# Patient Record
Sex: FEMALE | Race: WHITE | NOT HISPANIC OR LATINO | Employment: PART TIME | URBAN - METROPOLITAN AREA
[De-identification: names, ages, dates, MRNs, and addresses within clinical notes are randomized per-mention and may not be internally consistent; named-entity substitution may affect disease eponyms.]

---

## 2016-03-25 LAB — PAP RECOMMENDATION EXT: NORMAL

## 2019-06-21 ENCOUNTER — HISTORICAL (OUTPATIENT)
Dept: LAB | Facility: HOSPITAL | Age: 49
End: 2019-06-21

## 2019-06-21 LAB
ABS NEUT (OLG): 3.71 X10(3)/MCL (ref 2.1–9.2)
ALBUMIN SERPL-MCNC: 4 GM/DL (ref 3.4–5)
ALBUMIN/GLOB SERPL: 1.5 {RATIO}
ALP SERPL-CCNC: 58 UNIT/L (ref 38–126)
ALT SERPL-CCNC: 22 UNIT/L (ref 12–78)
APPEARANCE, UA: CLEAR
AST SERPL-CCNC: 11 UNIT/L (ref 15–37)
BACTERIA SPEC CULT: ABNORMAL /HPF
BASOPHILS # BLD AUTO: 0 X10(3)/MCL (ref 0–0.2)
BASOPHILS NFR BLD AUTO: 1 %
BILIRUB SERPL-MCNC: 0.4 MG/DL (ref 0.2–1)
BILIRUB UR QL STRIP: NEGATIVE
BILIRUBIN DIRECT+TOT PNL SERPL-MCNC: 0.1 MG/DL (ref 0–0.2)
BILIRUBIN DIRECT+TOT PNL SERPL-MCNC: 0.3 MG/DL (ref 0–0.8)
BUN SERPL-MCNC: 12 MG/DL (ref 7–18)
CALCIUM SERPL-MCNC: 9.1 MG/DL (ref 8.5–10.1)
CHLORIDE SERPL-SCNC: 107 MMOL/L (ref 98–107)
CHOLEST SERPL-MCNC: 272 MG/DL (ref 0–200)
CHOLEST/HDLC SERPL: 4.8 {RATIO} (ref 0–4)
CO2 SERPL-SCNC: 31 MMOL/L (ref 21–32)
COLOR UR: YELLOW
CREAT SERPL-MCNC: 0.77 MG/DL (ref 0.55–1.02)
EOSINOPHIL # BLD AUTO: 0.1 X10(3)/MCL (ref 0–0.9)
EOSINOPHIL NFR BLD AUTO: 2 %
ERYTHROCYTE [DISTWIDTH] IN BLOOD BY AUTOMATED COUNT: 14.8 % (ref 11.5–17)
EST. AVERAGE GLUCOSE BLD GHB EST-MCNC: 120 MG/DL
GLOBULIN SER-MCNC: 2.7 GM/DL (ref 2.4–3.5)
GLUCOSE (UA): NEGATIVE
GLUCOSE SERPL-MCNC: 93 MG/DL (ref 74–106)
HBA1C MFR BLD: 5.8 % (ref 4.2–6.3)
HCT VFR BLD AUTO: 42.4 % (ref 37–47)
HDLC SERPL-MCNC: 57 MG/DL (ref 35–60)
HGB BLD-MCNC: 13.2 GM/DL (ref 12–16)
HGB UR QL STRIP: NEGATIVE
KETONES UR QL STRIP: NEGATIVE
LDLC SERPL CALC-MCNC: 192 MG/DL (ref 0–129)
LEUKOCYTE ESTERASE UR QL STRIP: ABNORMAL
LYMPHOCYTES # BLD AUTO: 2.4 X10(3)/MCL (ref 0.6–4.6)
LYMPHOCYTES NFR BLD AUTO: 36 %
MCH RBC QN AUTO: 26 PG (ref 27–31)
MCHC RBC AUTO-ENTMCNC: 31.1 GM/DL (ref 33–36)
MCV RBC AUTO: 83.6 FL (ref 80–94)
MONOCYTES # BLD AUTO: 0.4 X10(3)/MCL (ref 0.1–1.3)
MONOCYTES NFR BLD AUTO: 6 %
NEUTROPHILS # BLD AUTO: 3.71 X10(3)/MCL (ref 2.1–9.2)
NEUTROPHILS NFR BLD AUTO: 55 %
NITRITE UR QL STRIP: NEGATIVE
PH UR STRIP: >=9 [PH] (ref 5–9)
PLATELET # BLD AUTO: 224 X10(3)/MCL (ref 130–400)
PMV BLD AUTO: 10.6 FL (ref 9.4–12.4)
POTASSIUM SERPL-SCNC: 4.3 MMOL/L (ref 3.5–5.1)
PROT SERPL-MCNC: 6.7 GM/DL (ref 6.4–8.2)
PROT UR QL STRIP: ABNORMAL
RBC # BLD AUTO: 5.07 X10(6)/MCL (ref 4.2–5.4)
RBC #/AREA URNS HPF: 3 /HPF (ref 0–2)
SODIUM SERPL-SCNC: 143 MMOL/L (ref 136–145)
SP GR UR STRIP: 1.02 (ref 1–1.03)
SQUAMOUS EPITHELIAL, UA: 6 /HPF (ref 0–4)
TRIGL SERPL-MCNC: 114 MG/DL (ref 30–150)
TSH SERPL-ACNC: 0.74 MIU/L (ref 0.36–3.74)
UROBILINOGEN UR STRIP-ACNC: 1
VLDLC SERPL CALC-MCNC: 23 MG/DL
WBC # SPEC AUTO: 6.8 X10(3)/MCL (ref 4.5–11.5)
WBC #/AREA URNS HPF: ABNORMAL /[HPF]

## 2019-06-23 LAB — FINAL CULTURE: NO GROWTH

## 2019-08-07 ENCOUNTER — HISTORICAL (OUTPATIENT)
Dept: LAB | Facility: HOSPITAL | Age: 49
End: 2019-08-07

## 2019-08-07 LAB
ALBUMIN SERPL-MCNC: 4.1 GM/DL (ref 3.4–5)
ALBUMIN/GLOB SERPL: 1.5 {RATIO}
ALP SERPL-CCNC: 56 UNIT/L (ref 38–126)
ALT SERPL-CCNC: 28 UNIT/L (ref 12–78)
AST SERPL-CCNC: 12 UNIT/L (ref 15–37)
BILIRUB SERPL-MCNC: 0.4 MG/DL (ref 0.2–1)
BILIRUBIN DIRECT+TOT PNL SERPL-MCNC: 0.1 MG/DL (ref 0–0.2)
BILIRUBIN DIRECT+TOT PNL SERPL-MCNC: 0.3 MG/DL (ref 0–0.8)
BUN SERPL-MCNC: 14 MG/DL (ref 7–18)
CALCIUM SERPL-MCNC: 7 MG/DL (ref 8.5–10.1)
CHLORIDE SERPL-SCNC: 109 MMOL/L (ref 98–107)
CHOLEST SERPL-MCNC: 202 MG/DL (ref 0–200)
CHOLEST/HDLC SERPL: 3.9 {RATIO} (ref 0–4)
CO2 SERPL-SCNC: 25 MMOL/L (ref 21–32)
CREAT SERPL-MCNC: 0.66 MG/DL (ref 0.55–1.02)
GLOBULIN SER-MCNC: 2.7 GM/DL (ref 2.4–3.5)
GLUCOSE SERPL-MCNC: 96 MG/DL (ref 74–106)
HDLC SERPL-MCNC: 52 MG/DL (ref 35–60)
LDLC SERPL CALC-MCNC: 123 MG/DL (ref 0–129)
POTASSIUM SERPL-SCNC: 4.1 MMOL/L (ref 3.5–5.1)
PROT SERPL-MCNC: 6.8 GM/DL (ref 6.4–8.2)
SODIUM SERPL-SCNC: 142 MMOL/L (ref 136–145)
TRIGL SERPL-MCNC: 134 MG/DL (ref 30–150)
VLDLC SERPL CALC-MCNC: 27 MG/DL

## 2020-06-17 ENCOUNTER — HISTORICAL (OUTPATIENT)
Dept: ADMINISTRATIVE | Facility: HOSPITAL | Age: 50
End: 2020-06-17

## 2020-06-17 LAB
ABS NEUT (OLG): 3.52 X10(3)/MCL (ref 2.1–9.2)
ALBUMIN SERPL-MCNC: 4 GM/DL (ref 3.5–5)
ALBUMIN/GLOB SERPL: 1.5 RATIO (ref 1.1–2)
ALP SERPL-CCNC: 48 UNIT/L (ref 40–150)
ALT SERPL-CCNC: 28 UNIT/L (ref 0–55)
APPEARANCE, UA: CLEAR
AST SERPL-CCNC: 24 UNIT/L (ref 5–34)
BACTERIA SPEC CULT: NORMAL /HPF
BASOPHILS # BLD AUTO: 0 X10(3)/MCL (ref 0–0.2)
BASOPHILS NFR BLD AUTO: 1 %
BILIRUB SERPL-MCNC: 0.4 MG/DL
BILIRUB UR QL STRIP: NEGATIVE
BILIRUBIN DIRECT+TOT PNL SERPL-MCNC: 0.2 MG/DL (ref 0–0.5)
BILIRUBIN DIRECT+TOT PNL SERPL-MCNC: 0.2 MG/DL (ref 0–0.8)
BUN SERPL-MCNC: 11.8 MG/DL (ref 9.8–20.1)
CALCIUM SERPL-MCNC: 8.9 MG/DL (ref 8.4–10.2)
CHLORIDE SERPL-SCNC: 108 MMOL/L (ref 98–107)
CHOLEST SERPL-MCNC: 215 MG/DL
CHOLEST/HDLC SERPL: 4 {RATIO} (ref 0–5)
CO2 SERPL-SCNC: 25 MMOL/L (ref 22–29)
COLOR UR: YELLOW
CREAT SERPL-MCNC: 0.75 MG/DL (ref 0.55–1.02)
EOSINOPHIL # BLD AUTO: 0.2 X10(3)/MCL (ref 0–0.9)
EOSINOPHIL NFR BLD AUTO: 2 %
ERYTHROCYTE [DISTWIDTH] IN BLOOD BY AUTOMATED COUNT: 14.6 % (ref 11.5–17)
EST. AVERAGE GLUCOSE BLD GHB EST-MCNC: 114 MG/DL
GLOBULIN SER-MCNC: 2.6 GM/DL (ref 2.4–3.5)
GLUCOSE (UA): NEGATIVE
GLUCOSE SERPL-MCNC: 105 MG/DL (ref 74–100)
HBA1C MFR BLD: 5.6 %
HCT VFR BLD AUTO: 42.3 % (ref 37–47)
HDLC SERPL-MCNC: 52 MG/DL (ref 35–60)
HGB BLD-MCNC: 13 GM/DL (ref 12–16)
HGB UR QL STRIP: NEGATIVE
KETONES UR QL STRIP: NEGATIVE
LDLC SERPL CALC-MCNC: 138 MG/DL (ref 50–140)
LEUKOCYTE ESTERASE UR QL STRIP: NEGATIVE
LYMPHOCYTES # BLD AUTO: 2.1 X10(3)/MCL (ref 0.6–4.6)
LYMPHOCYTES NFR BLD AUTO: 33 %
MCH RBC QN AUTO: 26 PG (ref 27–31)
MCHC RBC AUTO-ENTMCNC: 30.7 GM/DL (ref 33–36)
MCV RBC AUTO: 84.6 FL (ref 80–94)
MONOCYTES # BLD AUTO: 0.5 X10(3)/MCL (ref 0.1–1.3)
MONOCYTES NFR BLD AUTO: 8 %
NEUTROPHILS # BLD AUTO: 3.52 X10(3)/MCL (ref 2.1–9.2)
NEUTROPHILS NFR BLD AUTO: 56 %
NITRITE UR QL STRIP: NEGATIVE
PH UR STRIP: 5 [PH] (ref 5–9)
PLATELET # BLD AUTO: 264 X10(3)/MCL (ref 130–400)
PMV BLD AUTO: 10.2 FL (ref 9.4–12.4)
POTASSIUM SERPL-SCNC: 4 MMOL/L (ref 3.5–5.1)
PROT SERPL-MCNC: 6.6 GM/DL (ref 6.4–8.3)
PROT UR QL STRIP: NEGATIVE
RBC # BLD AUTO: 5 X10(6)/MCL (ref 4.2–5.4)
RBC #/AREA URNS HPF: NORMAL /[HPF]
SODIUM SERPL-SCNC: 142 MMOL/L (ref 136–145)
SP GR UR STRIP: 1.03 (ref 1–1.03)
SQUAMOUS EPITHELIAL, UA: NORMAL
TRIGL SERPL-MCNC: 125 MG/DL (ref 37–140)
TSH SERPL-ACNC: 0.67 UIU/ML (ref 0.35–4.94)
UROBILINOGEN UR STRIP-ACNC: 0.2
VLDLC SERPL CALC-MCNC: 25 MG/DL
WBC # SPEC AUTO: 6.3 X10(3)/MCL (ref 4.5–11.5)
WBC #/AREA URNS HPF: NORMAL /[HPF]

## 2022-04-10 ENCOUNTER — HISTORICAL (OUTPATIENT)
Dept: ADMINISTRATIVE | Facility: HOSPITAL | Age: 52
End: 2022-04-10

## 2022-04-26 VITALS
HEIGHT: 65 IN | SYSTOLIC BLOOD PRESSURE: 118 MMHG | DIASTOLIC BLOOD PRESSURE: 76 MMHG | WEIGHT: 163.38 LBS | BODY MASS INDEX: 27.22 KG/M2 | OXYGEN SATURATION: 99 %

## 2022-05-12 RX ORDER — SERTRALINE HYDROCHLORIDE 100 MG/1
150 TABLET, FILM COATED ORAL DAILY
COMMUNITY
Start: 2003-02-12 | End: 2022-05-12 | Stop reason: SDUPTHER

## 2022-05-12 RX ORDER — SERTRALINE HYDROCHLORIDE 100 MG/1
150 TABLET, FILM COATED ORAL DAILY
Qty: 7 TABLET | Refills: 0 | Status: SHIPPED | OUTPATIENT
Start: 2022-05-12 | End: 2022-05-16 | Stop reason: SDUPTHER

## 2022-05-12 NOTE — TELEPHONE ENCOUNTER
----- Message from Asher Willis MA sent at 5/12/2022  3:25 PM CDT -----  Regarding: medication refill  .Type:  RX Refill Request    Who Called:  patient   Refill or New Rx: refill   RX Name and Strength:Zoloft  150mg   How is the patient currently taking it? (ex. 1XDay):once a day   Is this a 30 day or 90 day RX: 90  Preferred Pharmacy with phone number: [facility]   Local or Mail Order: local   Ordering Provider:Dr. Kim  Would the patient rather a call back or a response via [facility]? call  Best Call Back Number: [phone]  Additional Information: only suppose to be a few pills to hold her over until her next upcoming appointment

## 2022-05-16 ENCOUNTER — OFFICE VISIT (OUTPATIENT)
Dept: FAMILY MEDICINE | Facility: CLINIC | Age: 52
End: 2022-05-16
Payer: COMMERCIAL

## 2022-05-16 VITALS
TEMPERATURE: 98 F | HEART RATE: 84 BPM | HEIGHT: 65 IN | WEIGHT: 167.5 LBS | BODY MASS INDEX: 27.91 KG/M2 | RESPIRATION RATE: 17 BRPM | DIASTOLIC BLOOD PRESSURE: 69 MMHG | SYSTOLIC BLOOD PRESSURE: 115 MMHG | OXYGEN SATURATION: 97 %

## 2022-05-16 DIAGNOSIS — F43.10 POSTTRAUMATIC STRESS DISORDER: Chronic | ICD-10-CM

## 2022-05-16 DIAGNOSIS — E78.00 HYPERCHOLESTEROLEMIA: Chronic | ICD-10-CM

## 2022-05-16 DIAGNOSIS — F33.1 MODERATE EPISODE OF RECURRENT MAJOR DEPRESSIVE DISORDER: Chronic | ICD-10-CM

## 2022-05-16 DIAGNOSIS — Z78.0 POSTMENOPAUSAL: ICD-10-CM

## 2022-05-16 DIAGNOSIS — Z13.820 SCREENING FOR OSTEOPOROSIS: ICD-10-CM

## 2022-05-16 DIAGNOSIS — F17.200 NEEDS SMOKING CESSATION EDUCATION: ICD-10-CM

## 2022-05-16 DIAGNOSIS — Z00.00 WELL ADULT EXAM: Primary | ICD-10-CM

## 2022-05-16 DIAGNOSIS — R21 RASH: ICD-10-CM

## 2022-05-16 DIAGNOSIS — Z72.0 TOBACCO USER: Chronic | ICD-10-CM

## 2022-05-16 DIAGNOSIS — Z12.31 BREAST CANCER SCREENING BY MAMMOGRAM: ICD-10-CM

## 2022-05-16 PROBLEM — M26.629 TEMPOROMANDIBULAR JOINT PAIN-DYSFUNCTION SYNDROME: Status: ACTIVE | Noted: 2022-05-16

## 2022-05-16 PROBLEM — G47.00 INSOMNIA: Chronic | Status: ACTIVE | Noted: 2022-05-16

## 2022-05-16 PROBLEM — G47.00 INSOMNIA: Status: ACTIVE | Noted: 2022-05-16

## 2022-05-16 PROBLEM — M26.629 TEMPOROMANDIBULAR JOINT PAIN-DYSFUNCTION SYNDROME: Chronic | Status: ACTIVE | Noted: 2022-05-16

## 2022-05-16 PROCEDURE — 1159F MED LIST DOCD IN RCRD: CPT | Mod: CPTII,,, | Performed by: NURSE PRACTITIONER

## 2022-05-16 PROCEDURE — 1160F PR REVIEW ALL MEDS BY PRESCRIBER/CLIN PHARMACIST DOCUMENTED: ICD-10-PCS | Mod: CPTII,,, | Performed by: NURSE PRACTITIONER

## 2022-05-16 PROCEDURE — 1160F RVW MEDS BY RX/DR IN RCRD: CPT | Mod: CPTII,,, | Performed by: NURSE PRACTITIONER

## 2022-05-16 PROCEDURE — 3074F SYST BP LT 130 MM HG: CPT | Mod: CPTII,,, | Performed by: NURSE PRACTITIONER

## 2022-05-16 PROCEDURE — 90750 HZV VACC RECOMBINANT IM: CPT | Mod: ,,, | Performed by: NURSE PRACTITIONER

## 2022-05-16 PROCEDURE — 3074F PR MOST RECENT SYSTOLIC BLOOD PRESSURE < 130 MM HG: ICD-10-PCS | Mod: CPTII,,, | Performed by: NURSE PRACTITIONER

## 2022-05-16 PROCEDURE — 99396 PREV VISIT EST AGE 40-64: CPT | Mod: 25,,, | Performed by: NURSE PRACTITIONER

## 2022-05-16 PROCEDURE — 90471 IMMUNIZATION ADMIN: CPT | Mod: ,,, | Performed by: NURSE PRACTITIONER

## 2022-05-16 PROCEDURE — 90471 ZOSTER RECOMBINANT VACCINE: ICD-10-PCS | Mod: ,,, | Performed by: NURSE PRACTITIONER

## 2022-05-16 PROCEDURE — 3078F DIAST BP <80 MM HG: CPT | Mod: CPTII,,, | Performed by: NURSE PRACTITIONER

## 2022-05-16 PROCEDURE — 3008F BODY MASS INDEX DOCD: CPT | Mod: CPTII,,, | Performed by: NURSE PRACTITIONER

## 2022-05-16 PROCEDURE — 99396 PR PREVENTIVE VISIT,EST,40-64: ICD-10-PCS | Mod: 25,,, | Performed by: NURSE PRACTITIONER

## 2022-05-16 PROCEDURE — 1159F PR MEDICATION LIST DOCUMENTED IN MEDICAL RECORD: ICD-10-PCS | Mod: CPTII,,, | Performed by: NURSE PRACTITIONER

## 2022-05-16 PROCEDURE — 90750 ZOSTER RECOMBINANT VACCINE: ICD-10-PCS | Mod: ,,, | Performed by: NURSE PRACTITIONER

## 2022-05-16 PROCEDURE — 3078F PR MOST RECENT DIASTOLIC BLOOD PRESSURE < 80 MM HG: ICD-10-PCS | Mod: CPTII,,, | Performed by: NURSE PRACTITIONER

## 2022-05-16 PROCEDURE — 3008F PR BODY MASS INDEX (BMI) DOCUMENTED: ICD-10-PCS | Mod: CPTII,,, | Performed by: NURSE PRACTITIONER

## 2022-05-16 RX ORDER — SERTRALINE HYDROCHLORIDE 100 MG/1
150 TABLET, FILM COATED ORAL DAILY
Qty: 135 TABLET | Refills: 3 | Status: SHIPPED | OUTPATIENT
Start: 2022-05-16 | End: 2023-05-16 | Stop reason: SDUPTHER

## 2022-05-16 RX ORDER — ROSUVASTATIN CALCIUM 10 MG/1
TABLET, COATED ORAL
COMMUNITY
Start: 2021-10-27 | End: 2023-05-16

## 2022-05-16 RX ORDER — ALBUTEROL SULFATE 90 UG/1
90 AEROSOL, METERED RESPIRATORY (INHALATION)
COMMUNITY
Start: 2022-01-04

## 2022-05-16 NOTE — PROGRESS NOTES
Patient ID: [id number]     Chief Complaint: Medication Refill and Annual Exam        HPI:     Spring Kauffman is a 51 y.o. female here today for an annual wellness visit.  She has not had bloodwork completed prior to visit as ordered but will do so in the near future. Overall she feels well. She was treated for eczema to her hands with topical steroid cream but reports no significant improvement. Also having pain and swelling to PIP joints bilaterally. Would like second opinion to rule out psoriatic arthritis. No other complaints today.         Past Medical History:  Hypercholesterolemia  Insomnia  Moderate episode of recurrent major depressive disorder  Posttraumatic stress disorder  Temporomandibular joint pain-dysfunction syndrome     Past Surgical History:   Procedure Laterality Date    TONSILLECTOMY  1979       Review of patient's allergies indicates:   Allergen Reactions    Nickel sutures [surgical stainless steel] Rash       Outpatient Medications Marked as Taking for the 5/16/22 encounter (Office Visit) with MAXIMO Nazario   Medication Sig Dispense Refill    albuterol (PROVENTIL/VENTOLIN HFA) 90 mcg/actuation inhaler Inhale 90 puffs into the lungs as needed.      rosuvastatin (CRESTOR) 10 MG tablet   See Instructions, TAKE ONE TABLET BY MOUTH EVERY DAY, # 90 tab(s), 0 Refill(s), Pharmacy: [facility] [id number] [id number], 166, cm, Height/Length Dosing, 10/22/20 11:28:00 CDT, 74.1, kg, Weight Dosing, 10/22/20 11:28:00 CDT      [DISCONTINUED] sertraline (ZOLOFT) 100 MG tablet Take 1.5 tablets (150 mg total) by mouth once daily. 7 tablet 0       Social History     Socioeconomic History    Marital status:    Tobacco Use    Smoking status: Current Every Day Smoker     Packs/day: 1.00     Types: Cigarettes    Smokeless tobacco: Never Used   Substance and Sexual Activity    Alcohol use: Not Currently    Drug use: Never    Sexual activity: Yes        Family History   Problem Relation Age of Onset  "   Breast cancer Mother     Lupus Mother     Pancreatic cancer Father         Patient Care Team:  MAXIMO Nazario as PCP - General (Internal Medicine)  Haja Sosa MD as Consulting Physician (Gastroenterology)       Subjective:     ROS    See HPI for details    Constitutional: Denies Change in appetite. Denies Chills. Denies Fever. Denies Night sweats.  Eye: Denies Blurred vision. Denies Discharge. Denies Eye pain.  ENT: Denies Decreased hearing. Denies Sore throat. Denies Swollen glands.  Respiratory: Denies Cough. Denies Shortness of breath. Denies Shortness of breath with exertion. Denies Wheezing.  Cardiovascular: DeniesChest pain at rest. Denies Chest pain with exertion. Denies Irregular heartbeat. Denies Palpitations. Denies Edema.  Gastrointestinal: Denies Abdominal pain. DeniesDiarrhea. Denies Nausea. Denies Vomiting. Denies Hematemesis or Hematochezia.  Genitourinary: Denies Dysuria. Denies Urinary frequency. Denies Urinary urgency. Denies Blood in urine.  Endocrine: Denies Cold intolerance. Denies Excessive thirst. Denies Heat intolerance. Denies Weight loss. Denies Weight gain.  Musculoskeletal: Admits Painful joints. Denies Weakness.  Integumentary: Admits Rash. Denies Itching. Denies Dry skin.  Neurologic: Denies Dizziness. Denies Fainting. Denies Headache.  Psychiatric: Denies Depression. Denies Anxiety. Denies Suicidal/Homicidal ideations.    All Other ROS: Negative except as stated in HPI.       Objective:     /69 (BP Location: Right arm, Patient Position: Sitting, BP Method: Large (Manual))   Pulse 84   Temp 98.3 °F (36.8 °C) (Oral)   Resp 17   Ht 5' 5" (1.651 m)   Wt 76 kg (167 lb 8 oz)   SpO2 97%   BMI 27.87 kg/m²     Physical Exam    General: Alert and oriented, No acute distress.  Head: Normocephalic, Atraumatic.  Eye: Pupils are equal, round and reactive to light, Extraocular movements are intact, Sclera non-icteric.  Ears/Nose/Throat: Normal, Mucosa " moist,Clear.  Neck/Thyroid: Supple, Non-tender, No carotid bruit, No palpable thyromegaly or thyroid nodule, No lymphadenopathy, No JVD, Full range of motion.  Respiratory: Clear to auscultation bilaterally; No wheezes, rales or rhonchi, Non-labored respirations, Symmetrical chest wall expansion.  Cardiovascular: Regular rate and rhythm, S1/S2 normal, No murmurs, rubs or gallops.  Gastrointestinal: Soft, Non-tender, Non-distended, Normal bowel sounds, No palpable organomegaly.  Musculoskeletal: Normal range of motion.  Integumentary: Warm, Dry, Intact, No suspicious lesions. Dry, scaly rash to bilateral palms and dorsal surface of PIP joints.  Extremities: No clubbing, cyanosis or edema  Neurologic: No focal deficits, Cranial Nerves II-XII are grossly intact, Motor strength normal upper and lower extremities, Sensory exam intact.  Psychiatric: Normal interaction, Coherent speech, Euthymic mood, Appropriate affect       Assessment:       ICD-10-CM ICD-9-CM   1. Well adult exam  Z00.00 V70.0   2. Hypercholesterolemia  E78.00 272.0   3. Moderate episode of recurrent major depressive disorder  F33.1 296.32   4. Posttraumatic stress disorder  F43.10 309.81   5. Tobacco user  Z72.0 305.1   6. Postmenopausal  Z78.0 V49.81   7. Screening for osteoporosis  Z13.820 V82.81   8. Breast cancer screening by mammogram  Z12.31 V76.12   9. Rash  R21 782.1        Plan:         Health Maintenance Topics with due status: Not Due       Topic Last Completion Date    TETANUS VACCINE 06/24/2019    Colorectal Cancer Screening 08/05/2019    Lipid Panel 06/17/2020    Influenza Vaccine Not Due      1. Well adult exam  Cervical Cancer Screening - Last Pap several years ago. Referred to GYN.    Breast Cancer Screening - Several years. Due.    Colon Cancer Screening - Colonoscopy on 8/2019. Follow up in 10 years. Dr. Sosa.    Osteoporosis Screening - No prior screening. Ordered.     Eye Exam - Recommend annually.    Dental Exam - Recommend  biannual exams.     Vaccinations -   Immunization History   Administered Date(s) Administered    Tdap 06/24/2019      - CBC Auto Differential; Future  - Comprehensive Metabolic Panel; Future  - Hemoglobin A1C; Future  - Lipid Panel; Future  - TSH; Future  - Urinalysis, Reflex to Urine Culture Urine, Clean Catch    2. Hypercholesterolemia  Lab Results   Component Value Date    CHOL 215 (H) 06/17/2020    CHOL 202 (H) 08/07/2019    CHOL 272 (H) 06/21/2019     Lab Results   Component Value Date    HDL 52 06/17/2020    HDL 52 08/07/2019    HDL 57 06/21/2019     No results found for: LDLCALC  Lab Results   Component Value Date    TRIG 125 06/17/2020    TRIG 134 08/07/2019    TRIG 114 06/21/2019     Will resume crestor if needed once labwork reviewed.  Stressed importance of dietary modifications. Follow a low cholesterol, low saturated fat diet with less that 200mg of cholesterol a day.  Avoid fried foods and high saturated fats (high saturated fats less than 7% of calories).  Add Flax Seed/Fish Oil supplements to diet. Increase dietary fiber.  Regular exercise can reduce LDL and raise HDL. Stressed importance of physical activity 5 times per week for 30 minutes per day.       3. Moderate episode of recurrent major depressive disorder  Continue Zoloft 150mg daily  Read positive daily meditations, avoid negative media, set healthy boundaries.  Exercise daily, keep consistent sleep pattern, eat a healthy diet.  Establish good social support, make changes to reduce stress.  Reports any symptoms of suicidal/homicidal ideations or self harm immediately, if clinic is closed go to nearest emergency room.      4. Posttraumatic stress disorder  Continue  Zoloft 150mg daily  Practice deep breathing or abdominal breathing exercises when anxiety occurs.  Exercise daily. Get sunlight daily.  Avoid caffeine, alcohol and stimulants.  Practice positive phrases and repeat throughout the day, yoga, lavender scents or Chamomile tea will  help anxiety.  Set healthy boundaries, avoid people and conversations that increase stress.  Reports any symptoms of suicidal or homicidal ideations immediately, if clinic is closed go to nearest emergency room.    5. Tobacco user  Stressed importance of smoking cessation.  Not yet ready to quit.      Medication List with Changes/Refills   Current Medications    ALBUTEROL (PROVENTIL/VENTOLIN HFA) 90 MCG/ACTUATION INHALER    Inhale 90 puffs into the lungs as needed.       Start Date: 1/4/2022  End Date: --    ROSUVASTATIN (CRESTOR) 10 MG TABLET      See Instructions, TAKE ONE TABLET BY MOUTH EVERY DAY, # 90 tab(s), 0 Refill(s), Pharmacy: [id number], 166, cm, Height/Length Dosing, 10/22/20 11:28:00 CDT, 74.1, kg, Weight Dosing, 10/22/20 11:28:00 CDT       Start Date: 10/27/2021End Date: --   Changed and/or Refilled Medications    Modified Medication Previous Medication    SERTRALINE (ZOLOFT) 100 MG TABLET sertraline (ZOLOFT) 100 MG tablet       Take 1.5 tablets (150 mg total) by mouth once daily.    Take 1.5 tablets (150 mg total) by mouth once daily.       Start Date: 5/16/2022 End Date: 5/16/2023    Start Date: 5/12/2022 End Date: 5/16/2022          The patient's Health Maintenance was reviewed and the following appears to be due at this time:   Health Maintenance Due   Topic Date Due    Hepatitis C Screening  Never done    Cervical Cancer Screening  Never done    COVID-19 Vaccine (1) Never done    Pneumococcal Vaccines (Age 0-64) (1 - PCV) Never done    HIV Screening  Never done    Mammogram  Never done    Shingles Vaccine (1 of 2) Never done         Follow up in about 1 year (around 5/16/2023) for Annual Wellness. In addition to their scheduled follow up, the patient has also been instructed to follow up on as needed basis.

## 2023-02-14 ENCOUNTER — CLINICAL SUPPORT (OUTPATIENT)
Dept: RESPIRATORY THERAPY | Facility: HOSPITAL | Age: 53
End: 2023-02-14
Attending: NURSE PRACTITIONER
Payer: COMMERCIAL

## 2023-02-14 ENCOUNTER — HOSPITAL ENCOUNTER (OUTPATIENT)
Dept: RADIOLOGY | Facility: HOSPITAL | Age: 53
Discharge: HOME | End: 2023-02-14
Attending: NURSE PRACTITIONER
Payer: COMMERCIAL

## 2023-02-14 DIAGNOSIS — Z01.818 PREOP EXAMINATION: ICD-10-CM

## 2023-02-14 DIAGNOSIS — Z01.818 PREOP EXAMINATION: Primary | ICD-10-CM

## 2023-02-14 PROCEDURE — 93005 ELECTROCARDIOGRAM TRACING: CPT

## 2023-02-14 PROCEDURE — 71046 X-RAY EXAM CHEST 2 VIEWS: CPT | Mod: TC

## 2023-02-14 PROCEDURE — 93010 EKG 12-LEAD: ICD-10-PCS | Mod: ,,, | Performed by: INTERNAL MEDICINE

## 2023-02-14 PROCEDURE — 93010 ELECTROCARDIOGRAM REPORT: CPT | Mod: ,,, | Performed by: INTERNAL MEDICINE

## 2023-05-11 ENCOUNTER — TELEPHONE (OUTPATIENT)
Dept: FAMILY MEDICINE | Facility: CLINIC | Age: 53
End: 2023-05-11

## 2023-05-16 ENCOUNTER — OFFICE VISIT (OUTPATIENT)
Dept: FAMILY MEDICINE | Facility: CLINIC | Age: 53
End: 2023-05-16
Payer: COMMERCIAL

## 2023-05-16 VITALS
WEIGHT: 154.13 LBS | DIASTOLIC BLOOD PRESSURE: 72 MMHG | HEIGHT: 65 IN | HEART RATE: 73 BPM | TEMPERATURE: 98 F | OXYGEN SATURATION: 96 % | SYSTOLIC BLOOD PRESSURE: 102 MMHG | RESPIRATION RATE: 16 BRPM | BODY MASS INDEX: 25.68 KG/M2

## 2023-05-16 DIAGNOSIS — Z23 NEED FOR VACCINATION FOR STREP PNEUMONIAE: ICD-10-CM

## 2023-05-16 DIAGNOSIS — Z11.4 ENCOUNTER FOR SCREENING FOR HIV: ICD-10-CM

## 2023-05-16 DIAGNOSIS — F43.10 PTSD (POST-TRAUMATIC STRESS DISORDER): ICD-10-CM

## 2023-05-16 DIAGNOSIS — F41.9 ANXIETY: ICD-10-CM

## 2023-05-16 DIAGNOSIS — Z11.59 ENCOUNTER FOR HEPATITIS C SCREENING TEST FOR LOW RISK PATIENT: ICD-10-CM

## 2023-05-16 DIAGNOSIS — E78.2 MIXED HYPERLIPIDEMIA: ICD-10-CM

## 2023-05-16 DIAGNOSIS — G47.00 INSOMNIA, UNSPECIFIED TYPE: ICD-10-CM

## 2023-05-16 DIAGNOSIS — Z23 NEED FOR SHINGLES VACCINE: ICD-10-CM

## 2023-05-16 DIAGNOSIS — F32.A DEPRESSION, UNSPECIFIED DEPRESSION TYPE: Primary | ICD-10-CM

## 2023-05-16 DIAGNOSIS — Z12.31 BREAST CANCER SCREENING BY MAMMOGRAM: ICD-10-CM

## 2023-05-16 DIAGNOSIS — F17.210 CIGARETTE NICOTINE DEPENDENCE WITHOUT COMPLICATION: ICD-10-CM

## 2023-05-16 PROCEDURE — 99406 BEHAV CHNG SMOKING 3-10 MIN: CPT | Mod: ,,, | Performed by: STUDENT IN AN ORGANIZED HEALTH CARE EDUCATION/TRAINING PROGRAM

## 2023-05-16 PROCEDURE — 99214 OFFICE O/P EST MOD 30 MIN: CPT | Mod: 25,,, | Performed by: STUDENT IN AN ORGANIZED HEALTH CARE EDUCATION/TRAINING PROGRAM

## 2023-05-16 PROCEDURE — 99214 PR OFFICE/OUTPT VISIT, EST, LEVL IV, 30-39 MIN: ICD-10-PCS | Mod: 25,,, | Performed by: STUDENT IN AN ORGANIZED HEALTH CARE EDUCATION/TRAINING PROGRAM

## 2023-05-16 PROCEDURE — 99406 PR TOBACCO USE CESSATION INTERMEDIATE 3-10 MINUTES: ICD-10-PCS | Mod: ,,, | Performed by: STUDENT IN AN ORGANIZED HEALTH CARE EDUCATION/TRAINING PROGRAM

## 2023-05-16 RX ORDER — PRAVASTATIN SODIUM 10 MG/1
10 TABLET ORAL DAILY
Qty: 90 TABLET | Refills: 3 | Status: SHIPPED | OUTPATIENT
Start: 2023-05-16 | End: 2023-06-15 | Stop reason: SDUPTHER

## 2023-05-16 RX ORDER — SERTRALINE HYDROCHLORIDE 100 MG/1
150 TABLET, FILM COATED ORAL DAILY
Qty: 135 TABLET | Refills: 3 | Status: SHIPPED | OUTPATIENT
Start: 2023-05-16 | End: 2023-06-15 | Stop reason: SDUPTHER

## 2023-05-16 RX ORDER — ZOSTER VACCINE RECOMBINANT, ADJUVANTED 50 MCG/0.5
0.5 KIT INTRAMUSCULAR ONCE
Qty: 1 EACH | Refills: 1 | Status: SHIPPED | OUTPATIENT
Start: 2023-05-16 | End: 2023-05-16

## 2023-05-16 NOTE — PROGRESS NOTES
Patient ID: [id number]     Chief Complaint: Follow up Depression and Medication Refill        HPI:      Disclaimer:  This note is prepared using voice recognition software and as such is likely to have errors despite attempts at proofreading. Please contact me for questions.     Spring Kauffman is a 52 y.o. female here today for a follow up for depression anxiety/ PTSD.  Patient reports that she is been feeling great.  Denies of any new concerns.  She has a lost almost 20 lb in 1 year with her diet. No other complaints today.  Denies of any homicidal/suicidal symptoms.    Chronic Issues-    Hypercholesterolemia  Currently prescribed statins but not taking it because of leg cramps.   Lipid panel results pending.    Insomnia  Has been having trouble in sleeping   Reports that she was on Ambien prescribed by her former PCP and she took it on p.r.n. basis  Would like to try melatonin OTC     Tobacco  Has been smoking more than 30 years   Has been smoking less 1/2 pack of cigg    Temporomandibular joint pain-dysfunction syndrome   Resolved  Past Surgical History:   Procedure Laterality Date    TONSILLECTOMY  1979       Review of patient's allergies indicates:   Allergen Reactions    Nickel sutures [surgical stainless steel] Rash       Outpatient Medications Marked as Taking for the 5/16/23 encounter (Office Visit) with Kaylyn Gayle MD   Medication Sig Dispense Refill    sertraline (ZOLOFT) 100 MG tablet Take 1.5 tablets (150 mg total) by mouth once daily. 135 tablet 3       Social History     Socioeconomic History    Marital status:    Tobacco Use    Smoking status: Every Day     Packs/day: 1.00     Types: Cigarettes    Smokeless tobacco: Never   Substance and Sexual Activity    Alcohol use: Not Currently    Drug use: Never    Sexual activity: Yes     Partners: Male     Social Determinants of Health     Financial Resource Strain: Low Risk     Difficulty of Paying Living Expenses: Not hard at all   Food  Insecurity: No Food Insecurity    Worried About Running Out of Food in the Last Year: Never true    Ran Out of Food in the Last Year: Never true   Transportation Needs: No Transportation Needs    Lack of Transportation (Medical): No    Lack of Transportation (Non-Medical): No   Physical Activity: Inactive    Days of Exercise per Week: 0 days    Minutes of Exercise per Session: 0 min   Stress: No Stress Concern Present    Feeling of Stress : Only a little   Social Connections: Moderately Integrated    Frequency of Communication with Friends and Family: More than three times a week    Frequency of Social Gatherings with Friends and Family: More than three times a week    Attends Islam Services: 1 to 4 times per year    Active Member of Clubs or Organizations: No    Attends Club or Organization Meetings: Never    Marital Status:    Housing Stability: Low Risk     Unable to Pay for Housing in the Last Year: No    Number of Places Lived in the Last Year: 1    Unstable Housing in the Last Year: No        Family History   Problem Relation Age of Onset    Breast cancer Mother     Lupus Mother     Pancreatic cancer Father         Patient Care Team:  MAXIMO Vanegas as PCP - General (Family Medicine)  Haja Sosa MD as Consulting Physician (Gastroenterology)     Subjective:     ROS    See HPI for details    Constitutional: Denies Change in appetite. Denies Chills. Denies Fever. Denies Night sweats.  Respiratory: Denies Cough. Denies Shortness of breath. Denies Shortness of breath with exertion. Denies Wheezing.  Cardiovascular: DeniesChest pain at rest. Denies Chest pain with exertion. Denies Irregular heartbeat. Denies Palpitations. Denies Edema.  Gastrointestinal: Denies Abdominal pain. DeniesDiarrhea. Denies Nausea. Denies Vomiting. Denies Hematemesis or Hematochezia.  Genitourinary: Denies Dysuria. Denies Urinary frequency. Denies Urinary urgency. Denies Blood in urine.  Endocrine: Denies Cold  "intolerance. Denies Excessive thirst. Denies Heat intolerance. Denies Weight loss. Denies Weight gain.  Musculoskeletal: Denies Painful joints. Denies Weakness.  Integumentary: Denies Rash. Denies Itching. Denies Dry skin.  Neurologic: Denies Dizziness. Denies Fainting. Denies Headache.  Psychiatric: Denies Depression. Denies Anxiety. Denies Suicidal/Homicidal ideations.    All Other ROS: Negative except as stated in HPI.       Objective:     /72 (BP Location: Right arm, Patient Position: Sitting, BP Method: Small (Automatic))   Pulse 73   Temp 98.1 °F (36.7 °C) (Oral)   Resp 16   Ht 5' 5" (1.651 m)   Wt 69.9 kg (154 lb 1.6 oz)   SpO2 96%   BMI 25.64 kg/m²     Physical Exam    General: Alert and oriented, No acute distress.  Respiratory: Clear to auscultation bilaterally; No wheezes, rales or rhonchi,Non-labored respirations, Symmetrical chest wall expansion.  Cardiovascular: Regular rate and rhythm, S1/S2 normal, No murmurs, rubs or gallops.  Gastrointestinal: Soft, Non-tender, Non-distended, Normal bowel sounds, No palpable organomegaly.  Musculoskeletal: Normal range of motion.  Integumentary: Warm, Dry, Intact, No suspicious lesions or rashes.  Extremities: No clubbing, cyanosis or edema  Neurologic: No focal deficits, Cranial Nerves II-XII are grossly intact, Motor strength normal upper and lower extremities, Sensory exam intact.  Psychiatric: Normal interaction, Coherent speech, Euthymic mood, Appropriate affect         Assessment:       ICD-10-CM ICD-9-CM   1. Depression, unspecified depression type  F32.A 311   2. PTSD (post-traumatic stress disorder)  F43.10 309.81   3. Anxiety  F41.9 300.00   4. Mixed hyperlipidemia  E78.2 272.2   5. Need for shingles vaccine  Z23 V04.89   6. Insomnia, unspecified type  G47.00 780.52   7. Breast cancer screening by mammogram  Z12.31 V76.12   8. Need for vaccination for Strep pneumoniae  Z23 V03.82   9. Encounter for screening for HIV  Z11.4 V73.89   10. " Encounter for hepatitis C screening test for low risk patient  Z11.59 V73.89   11. Cigarette nicotine dependence without complication  F17.210 305.1        Plan:     1. Depression, unspecified depression type  - CBC Auto Differential; Future  - Comprehensive Metabolic Panel; Future  - TSH; Future  - Hemoglobin A1C; Future  - Urinalysis; Future  - Urinalysis    2. PTSD (post-traumatic stress disorder)  3. Anxiety  Zoloft as prescribed  ER precautions provided  Recommend to practice in mental health exercises like yoga  - sertraline (ZOLOFT) 100 MG tablet; Take 1.5 tablets (150 mg total) by mouth once daily.  Dispense: 135 tablet; Refill: 3    4. Mixed hyperlipidemia  Rate Crestor  Discontinue Crestor   Start pravastatin as prescribed  - Lipid Panel; Future  - pravastatin (PRAVACHOL) 10 MG tablet; Take 1 tablet (10 mg total) by mouth once daily.  Dispense: 90 tablet; Refill: 3    5. Need for shingles vaccine  - varicella-zoster [id number], PF, (SHINGRIX, PF,) 50 mcg/0.5 mL injection; Inject 0.5 mLs into the muscle once. Repeat dose x 1 in 2-6 months for 1 dose  Dispense: 1 each; Refill: 1    6. Insomnia, unspecified type  Recommend maintain sleep diary/sleep hygiene   Also start OTC melatonin up to 10 mg daily    7. Breast cancer screening by mammogram  - Mammo Digital Screening Bilat w/ Roman; Future    8. Need for vaccination for Strep pneumoniae  - Pneumococcal Conjugate Vaccine (20 Valent) (IM); Future    9. Encounter for screening for HIV  - HIV 1/2 Ag/Ab (4th Gen); Future    10. Encounter for hepatitis C screening test for low risk patient  - Hepatitis C Antibody; Future    11. Cigarette nicotine dependence without complication  Discussed the risk of Smoking causing cancer for > 3 min  Offered Smoking cessation program. Patient may consider smoking cessation but declined as of now.   Patient voiced understanding the risks of smoking program in future.           Medication List with Changes/Refills   Current  Medications    ALBUTEROL (PROVENTIL/VENTOLIN HFA) 90 MCG/ACTUATION INHALER    Inhale 90 puffs into the lungs as needed.       Start Date: 1/4/2022  End Date: --    ROSUVASTATIN (CRESTOR) 10 MG TABLET      See Instructions, TAKE ONE TABLET BY MOUTH EVERY DAY, # 90 tab(s), 0 Refill(s), Pharmacy: [facility] [id number] [id number], 166, cm, Height/Length Dosing, 10/22/20 11:28:00 CDT, 74.1, kg, Weight Dosing, 10/22/20 11:28:00 CDT       Start Date: 10/27/2021End Date: --    SERTRALINE (ZOLOFT) 100 MG TABLET    Take 1.5 tablets (150 mg total) by mouth once daily.       Start Date: 5/16/2022 End Date: 5/16/2023          Follow up in about 4 weeks (around 6/13/2023) for Annual Wellness. In addition to their scheduled follow up, the patient has also been instructed to follow up on as needed basis.

## 2023-05-24 ENCOUNTER — DOCUMENTATION ONLY (OUTPATIENT)
Dept: FAMILY MEDICINE | Facility: CLINIC | Age: 53
End: 2023-05-24

## 2023-05-30 ENCOUNTER — PATIENT MESSAGE (OUTPATIENT)
Dept: ADMINISTRATIVE | Facility: HOSPITAL | Age: 53
End: 2023-05-30

## 2023-05-31 DIAGNOSIS — Z12.31 BREAST CANCER SCREENING BY MAMMOGRAM: Primary | ICD-10-CM

## 2023-06-13 ENCOUNTER — HOSPITAL ENCOUNTER (OUTPATIENT)
Dept: RADIOLOGY | Facility: HOSPITAL | Age: 53
Discharge: HOME | End: 2023-06-13
Attending: STUDENT IN AN ORGANIZED HEALTH CARE EDUCATION/TRAINING PROGRAM
Payer: COMMERCIAL

## 2023-06-13 DIAGNOSIS — Z12.31 BREAST CANCER SCREENING BY MAMMOGRAM: ICD-10-CM

## 2023-06-13 LAB
APPEARANCE UR: CLEAR
BACTERIA #/AREA URNS AUTO: ABNORMAL /HPF
BILIRUB UR QL STRIP.AUTO: NEGATIVE MG/DL
COLOR UR: NORMAL
GLUCOSE UR QL STRIP.AUTO: NEGATIVE MG/DL
KETONES UR QL STRIP.AUTO: NEGATIVE MG/DL
LEUKOCYTE ESTERASE UR QL STRIP.AUTO: NEGATIVE UNIT/L
MUCOUS THREADS URNS QL MICRO: ABNORMAL /LPF
NITRITE UR QL STRIP.AUTO: NEGATIVE
PH UR STRIP.AUTO: 5.5 [PH]
PROT UR QL STRIP.AUTO: NEGATIVE MG/DL
RBC #/AREA URNS AUTO: <5 /HPF
RBC UR QL AUTO: NEGATIVE UNIT/L
SP GR UR STRIP.AUTO: 1.03 (ref 1–1.03)
SQUAMOUS #/AREA URNS AUTO: <5 /HPF
UROBILINOGEN UR STRIP-ACNC: 1 MG/DL
WBC #/AREA URNS AUTO: <5 /HPF

## 2023-06-13 PROCEDURE — 77067 SCR MAMMO BI INCL CAD: CPT | Mod: 26,,, | Performed by: STUDENT IN AN ORGANIZED HEALTH CARE EDUCATION/TRAINING PROGRAM

## 2023-06-13 PROCEDURE — 77067 MAMMO DIGITAL SCREENING BILAT WITH TOMO: ICD-10-PCS | Mod: 26,,, | Performed by: STUDENT IN AN ORGANIZED HEALTH CARE EDUCATION/TRAINING PROGRAM

## 2023-06-13 PROCEDURE — 77067 SCR MAMMO BI INCL CAD: CPT | Mod: TC

## 2023-06-13 PROCEDURE — 77063 BREAST TOMOSYNTHESIS BI: CPT | Mod: 26,,, | Performed by: STUDENT IN AN ORGANIZED HEALTH CARE EDUCATION/TRAINING PROGRAM

## 2023-06-13 PROCEDURE — 77063 MAMMO DIGITAL SCREENING BILAT WITH TOMO: ICD-10-PCS | Mod: 26,,, | Performed by: STUDENT IN AN ORGANIZED HEALTH CARE EDUCATION/TRAINING PROGRAM

## 2023-06-15 ENCOUNTER — OFFICE VISIT (OUTPATIENT)
Dept: FAMILY MEDICINE | Facility: CLINIC | Age: 53
End: 2023-06-15
Payer: COMMERCIAL

## 2023-06-15 VITALS
HEIGHT: 65 IN | RESPIRATION RATE: 17 BRPM | TEMPERATURE: 98 F | SYSTOLIC BLOOD PRESSURE: 120 MMHG | BODY MASS INDEX: 25.85 KG/M2 | DIASTOLIC BLOOD PRESSURE: 60 MMHG | HEART RATE: 83 BPM | WEIGHT: 155.13 LBS | OXYGEN SATURATION: 96 %

## 2023-06-15 DIAGNOSIS — F41.9 ANXIETY: ICD-10-CM

## 2023-06-15 DIAGNOSIS — E78.2 MIXED HYPERLIPIDEMIA: ICD-10-CM

## 2023-06-15 DIAGNOSIS — Z12.4 ENCOUNTER FOR PAPANICOLAOU SMEAR OF CERVIX WITH HUMAN PAPILLOMA VIRUS (HPV) DNA COTESTING: ICD-10-CM

## 2023-06-15 DIAGNOSIS — F51.01 PRIMARY INSOMNIA: ICD-10-CM

## 2023-06-15 DIAGNOSIS — Z23 NEED FOR SHINGLES VACCINE: ICD-10-CM

## 2023-06-15 DIAGNOSIS — F33.1 MODERATE EPISODE OF RECURRENT MAJOR DEPRESSIVE DISORDER: ICD-10-CM

## 2023-06-15 DIAGNOSIS — Z00.00 WELLNESS EXAMINATION: Primary | ICD-10-CM

## 2023-06-15 DIAGNOSIS — Z79.899 DRUG-INDUCED IMMUNODEFICIENCY: ICD-10-CM

## 2023-06-15 DIAGNOSIS — F17.210 CIGARETTE NICOTINE DEPENDENCE WITHOUT COMPLICATION: ICD-10-CM

## 2023-06-15 DIAGNOSIS — F43.10 PTSD (POST-TRAUMATIC STRESS DISORDER): ICD-10-CM

## 2023-06-15 DIAGNOSIS — D84.821 DRUG-INDUCED IMMUNODEFICIENCY: ICD-10-CM

## 2023-06-15 PROCEDURE — 3008F BODY MASS INDEX DOCD: CPT | Mod: CPTII,,, | Performed by: STUDENT IN AN ORGANIZED HEALTH CARE EDUCATION/TRAINING PROGRAM

## 2023-06-15 PROCEDURE — 1159F MED LIST DOCD IN RCRD: CPT | Mod: CPTII,,, | Performed by: STUDENT IN AN ORGANIZED HEALTH CARE EDUCATION/TRAINING PROGRAM

## 2023-06-15 PROCEDURE — 3074F SYST BP LT 130 MM HG: CPT | Mod: CPTII,,, | Performed by: STUDENT IN AN ORGANIZED HEALTH CARE EDUCATION/TRAINING PROGRAM

## 2023-06-15 PROCEDURE — 1160F RVW MEDS BY RX/DR IN RCRD: CPT | Mod: CPTII,,, | Performed by: STUDENT IN AN ORGANIZED HEALTH CARE EDUCATION/TRAINING PROGRAM

## 2023-06-15 PROCEDURE — 1160F PR REVIEW ALL MEDS BY PRESCRIBER/CLIN PHARMACIST DOCUMENTED: ICD-10-PCS | Mod: CPTII,,, | Performed by: STUDENT IN AN ORGANIZED HEALTH CARE EDUCATION/TRAINING PROGRAM

## 2023-06-15 PROCEDURE — 99396 PR PREVENTIVE VISIT,EST,40-64: ICD-10-PCS | Mod: ,,, | Performed by: STUDENT IN AN ORGANIZED HEALTH CARE EDUCATION/TRAINING PROGRAM

## 2023-06-15 PROCEDURE — 3074F PR MOST RECENT SYSTOLIC BLOOD PRESSURE < 130 MM HG: ICD-10-PCS | Mod: CPTII,,, | Performed by: STUDENT IN AN ORGANIZED HEALTH CARE EDUCATION/TRAINING PROGRAM

## 2023-06-15 PROCEDURE — 3078F DIAST BP <80 MM HG: CPT | Mod: CPTII,,, | Performed by: STUDENT IN AN ORGANIZED HEALTH CARE EDUCATION/TRAINING PROGRAM

## 2023-06-15 PROCEDURE — 3078F PR MOST RECENT DIASTOLIC BLOOD PRESSURE < 80 MM HG: ICD-10-PCS | Mod: CPTII,,, | Performed by: STUDENT IN AN ORGANIZED HEALTH CARE EDUCATION/TRAINING PROGRAM

## 2023-06-15 PROCEDURE — 99396 PREV VISIT EST AGE 40-64: CPT | Mod: ,,, | Performed by: STUDENT IN AN ORGANIZED HEALTH CARE EDUCATION/TRAINING PROGRAM

## 2023-06-15 PROCEDURE — 3008F PR BODY MASS INDEX (BMI) DOCUMENTED: ICD-10-PCS | Mod: CPTII,,, | Performed by: STUDENT IN AN ORGANIZED HEALTH CARE EDUCATION/TRAINING PROGRAM

## 2023-06-15 PROCEDURE — 1159F PR MEDICATION LIST DOCUMENTED IN MEDICAL RECORD: ICD-10-PCS | Mod: CPTII,,, | Performed by: STUDENT IN AN ORGANIZED HEALTH CARE EDUCATION/TRAINING PROGRAM

## 2023-06-15 RX ORDER — APREMILAST 30 MG/1
30 TABLET, FILM COATED ORAL 2 TIMES DAILY
COMMUNITY

## 2023-06-15 RX ORDER — ZOSTER VACCINE RECOMBINANT, ADJUVANTED 50 MCG/0.5
0.5 KIT INTRAMUSCULAR ONCE
Qty: 1 EACH | Refills: 1 | Status: SHIPPED | OUTPATIENT
Start: 2023-06-15 | End: 2023-06-15

## 2023-06-15 RX ORDER — PRAVASTATIN SODIUM 20 MG/1
20 TABLET ORAL DAILY
Qty: 90 TABLET | Refills: 3 | Status: SHIPPED | OUTPATIENT
Start: 2023-06-15 | End: 2024-06-14

## 2023-06-15 RX ORDER — DARIDOREXANT 50 MG/1
50 TABLET, FILM COATED ORAL NIGHTLY
Qty: 90 TABLET | Refills: 0 | Status: SHIPPED | OUTPATIENT
Start: 2023-06-15

## 2023-06-15 RX ORDER — SERTRALINE HYDROCHLORIDE 100 MG/1
150 TABLET, FILM COATED ORAL DAILY
Qty: 135 TABLET | Refills: 3 | Status: SHIPPED | OUTPATIENT
Start: 2023-06-15 | End: 2024-06-14

## 2023-06-15 NOTE — PROGRESS NOTES
Patient ID: [id number]     Chief Complaint: Annual Wellness       Disclaimer:  This note is prepared using voice recognition software and as such is likely to have errors despite attempts at proofreading. Please contact me for questions.    Spring Kauffman is a 53 y.o. female here today for an annual wellness visit.      Acute Issues-  Discussion of blood work results    PTSD (post-traumatic stress disorder)  Anxiety  Stable on Zoloft     Hypercholesterolemia  On pravastatin 10 mg     Insomnia  On melatonin     Tobacco  Has been smoking more than 30 years   Has been smoking less 1/2 pack of cigg    Psoriasis  Currently on Otezla for psoriasis   Well-controlled     Past Surgical History:   Procedure Laterality Date    CHOLECYSTECTOMY  12/2018    TONSILLECTOMY  1979       Review of patient's allergies indicates:   Allergen Reactions    Nickel sutures [surgical stainless steel] Rash    Lanolin     Nickel        Outpatient Medications Marked as Taking for the 6/15/23 encounter (Office Visit) with Kaylyn Gayle MD   Medication Sig Dispense Refill    albuterol (PROVENTIL/VENTOLIN HFA) 90 mcg/actuation inhaler Inhale 90 puffs into the lungs as needed.      pravastatin (PRAVACHOL) 10 MG tablet Take 1 tablet (10 mg total) by mouth once daily. 90 tablet 3    sertraline (ZOLOFT) 100 MG tablet Take 1.5 tablets (150 mg total) by mouth once daily. 135 tablet 3       Social History     Socioeconomic History    Marital status:    Tobacco Use    Smoking status: Every Day     Packs/day: 1.00     Years: 40.00     Pack years: 40.00     Types: Cigarettes     Start date: 1/1/1983     Passive exposure: Current    Smokeless tobacco: Never   Substance and Sexual Activity    Alcohol use: Yes     Alcohol/week: 2.0 standard drinks     Types: 2 Drinks containing 0.5 oz of alcohol per week    Drug use: Never    Sexual activity: Yes     Partners: Male     Birth control/protection: Post-menopausal     Social Determinants of Health      Financial Resource Strain: Low Risk     Difficulty of Paying Living Expenses: Not hard at all   Food Insecurity: No Food Insecurity    Worried About Running Out of Food in the Last Year: Never true    Ran Out of Food in the Last Year: Never true   Transportation Needs: No Transportation Needs    Lack of Transportation (Medical): No    Lack of Transportation (Non-Medical): No   Physical Activity: Inactive    Days of Exercise per Week: 0 days    Minutes of Exercise per Session: 0 min   Stress: No Stress Concern Present    Feeling of Stress : Only a little   Social Connections: Moderately Integrated    Frequency of Communication with Friends and Family: More than three times a week    Frequency of Social Gatherings with Friends and Family: More than three times a week    Attends [employer] Services: 1 to 4 times per year    Active Member of Clubs or Organizations: No    Attends Club or Organization Meetings: Never    Marital Status:    Housing Stability: Low Risk     Unable to Pay for Housing in the Last Year: No    Number of Places Lived in the Last Year: 1    Unstable Housing in the Last Year: No        Family History   Problem Relation Age of Onset    Breast cancer Mother     Lupus Mother     Cancer Mother     Pancreatic cancer Father     Arthritis Father     Cancer Father     Diabetes Father     Hypertension Father     Cancer Maternal Grandmother     Heart disease Paternal Grandfather     Hypertension Paternal Grandfather     Hypertension Paternal Grandmother         Patient Care Team:  Kaylyn Gayle MD as PCP - General (Family Medicine)  Haja Sosa MD as Consulting Physician (Gastroenterology)       Subjective:     ROS    See HPI for details    Constitutional: Denies Change in appetite. Denies Chills. Denies Fever. Denies Night sweats.  Respiratory: Denies Cough. Denies Shortness of breath. Denies Shortness of breath with exertion. Denies Wheezing.  Cardiovascular: DeniesChest pain at  "rest. Denies Chest pain with exertion. Denies Irregular heartbeat. Denies Palpitations. Denies Edema.  Gastrointestinal: Denies Abdominal pain. DeniesDiarrhea. Denies Nausea. Denies Vomiting. Denies Hematemesis or Hematochezia.  Genitourinary: Denies Dysuria. Denies Urinary frequency. Denies Urinary urgency. Denies Blood in urine.  Endocrine: Denies Cold intolerance. Denies Excessive thirst. Denies Heat intolerance. Denies Weight loss. Denies Weight gain.  Musculoskeletal: Denies Painful joints. Denies Weakness.  Integumentary: Denies Rash. Denies Itching. Denies Dry skin.  Neurologic: Denies Dizziness. Denies Fainting. Denies Headache.  Psychiatric: Denies Depression. Denies Anxiety. Denies Suicidal/Homicidal ideations.    All Other ROS: Negative except as stated in HPI.       Objective:     /60 (BP Location: Right arm, Patient Position: Sitting, BP Method: Large (Manual))   Pulse 83   Temp 98.3 °F (36.8 °C) (Oral)   Resp 17   Ht 5' 5" (1.651 m)   Wt 70.4 kg (155 lb 1.6 oz)   SpO2 96%   BMI 25.81 kg/m²     Physical Exam    General: Alert and oriented, No acute distress.  Neck/Thyroid: Supple, Non-tender  Respiratory: Clear to auscultation bilaterally; No wheezes  Cardiovascular: Regular rate and rhythm  Gastrointestinal: Soft, Non-tender,No palpable organomegaly.  Musculoskeletal: Normal range of motion.  Neurologic: No focal deficits  Psychiatric: Normal interaction, Coherent speech, Euthymic mood, Appropriate affect       Assessment:       ICD-10-CM ICD-9-CM   1. Wellness examination  Z00.00 V70.0   2. Anxiety  F41.9 300.00   3. Moderate episode of recurrent major depressive disorder  F33.1 296.32   4. Mixed hyperlipidemia  E78.2 272.2   5. Cigarette nicotine dependence without complication  F17.210 305.1   6. Encounter for Papanicolaou smear of cervix with human papilloma virus (HPV) DNA cotesting  Z12.4 V76.2   7. Need for shingles vaccine  Z23 V04.89   8. Primary insomnia  F51.01 307.42   9. PTSD " (post-traumatic stress disorder)  F43.10 309.81   10. Drug-induced immunodeficiency  D84.821 279.3    Z79.899 E947.9        Plan:         Health Maintenance Topics with due status: Not Due       Topic Last Completion Date    TETANUS VACCINE 06/24/2019    Colorectal Cancer Screening 08/05/2019    Hemoglobin A1c (Diabetic Prevention Screening) 06/13/2023    Mammogram 06/13/2023    Lipid Panel 06/13/2023    Influenza Vaccine Not Due          Vaccinations -   Immunization History   Administered Date(s) Administered    Tdap 06/24/2019    Zoster Recombinant 05/16/2022        1. Wellness examination  Cervical Cancer Screening -  Pap smear performed today  Breast Cancer Screening - Last Mammogram-  Normal. Follow up in 1 year  Colon Cancer Screening - Colonoscopy due in 2029  Eye Exam - Recommend annually.  Dental Exam - Recommend biannual exams.     Diet discussed (healthy food choices, reduce portions and overall calorie intake)  Exercise 30-45 minutes 5x per week  Avoid excessive alcohol and tobacco if taking  Immunizations dicussed  Monthly breast exam recommended  Preventative exams discussed.      2. Anxiety  3. Moderate episode of recurrent major depressive disorder   9. PTSD (post-traumatic stress disorder)  PHQ-9 score today is 0   Continue Zoloft as prescribed   Recommend practice mental exercise like yoga    4. Mixed hyperlipidemia  - pravastatin (PRAVACHOL) 20 MG tablet; Take 1 tablet (20 mg total) by mouth once daily.  Dispense: 90 tablet; Refill: 3    5. Cigarette nicotine dependence without complication  - CT Chest Lung Screening Low Dose; Future    6. Encounter for Papanicolaou smear of cervix with human papilloma virus (HPV) DNA cotesting  - Liquid-Based Pap Smear, Screening Screening    7. Need for shingles vaccine  - varicella-zoster [id number], PF, (SHINGRIX, PF,) 50 mcg/0.5 mL injection; Inject 0.5 mLs into the muscle once. Repeat dose x 1 in 2-6 months for 1 dose  Dispense: 1 each; Refill: 1    8.  Primary insomnia  Will send  Qvivivq to the pharmacy     9. Drug-induced immunodeficiency    On Otezla  Not leukopenic  No recurrent infections   Defer management to primary      Medication List with Changes/Refills   Current Medications    ALBUTEROL (PROVENTIL/VENTOLIN HFA) 90 MCG/ACTUATION INHALER    Inhale 90 puffs into the lungs as needed.       Start Date: 1/4/2022  End Date: --    APREMILAST (OTEZLA) 30 MG TAB    Take 30 mg by mouth 2 (two) times a day.       Start Date: --        End Date: --    PRAVASTATIN (PRAVACHOL) 10 MG TABLET    Take 1 tablet (10 mg total) by mouth once daily.       Start Date: 5/16/2023 End Date: 5/15/2024    SERTRALINE (ZOLOFT) 100 MG TABLET    Take 1.5 tablets (150 mg total) by mouth once daily.       Start Date: 5/16/2023 End Date: 5/15/2024          The patient's Health Maintenance was reviewed and the following appears to be due at this time:   Health Maintenance Due   Topic Date Due    COVID-19 Vaccine (1) Never done    Pneumococcal Vaccines (Age 0-64) (1 - PCV) Never done    Cervical Cancer Screening  03/23/2019    LDCT Lung Screen  Never done    Shingles Vaccine (2 of 2) 07/11/2022         Follow up in about 3 months (around 9/15/2023) for Follow Up Insomnia. .   In addition to their scheduled follow up, the patient has also been instructed to follow up on as needed basis.

## 2023-06-20 LAB — PSYCHE PATHOLOGY RESULT: NORMAL

## 2023-06-20 NOTE — PROGRESS NOTES
Please inform the patient that PAP SMEAR result -     Demonstrated no intraepithelial lesion at this time    HPV negative

## 2023-06-23 ENCOUNTER — TELEPHONE (OUTPATIENT)
Dept: FAMILY MEDICINE | Facility: CLINIC | Age: 53
End: 2023-06-23
Payer: COMMERCIAL

## 2023-06-23 NOTE — TELEPHONE ENCOUNTER
----- Message from Kaylyn Gayle MD sent at 6/20/2023  5:31 PM CDT -----  Please inform the patient that PAP SMEAR result -     Demonstrated no intraepithelial lesion at this time    HPV negative

## 2023-07-10 ENCOUNTER — TELEPHONE (OUTPATIENT)
Dept: FAMILY MEDICINE | Facility: CLINIC | Age: 53
End: 2023-07-10
Payer: COMMERCIAL

## 2023-07-10 NOTE — TELEPHONE ENCOUNTER
----- Message from Lalita Cuello sent at 7/10/2023 10:37 AM CDT -----  Regarding: PA  .Type:  Needs Medical Advice    Who Called: PATRICK Marcos  Symptoms (please be specific):    How long has patient had these symptoms:    Pharmacy name and phone #:    Would the patient rather a call back or a response via [facility]? Call back  Best Call Back Number: [phone] Case #: [id number]  Additional Information: Following up on PA status on daridorexant (QUVIVIQ) 50 mg Tab

## 2023-07-24 ENCOUNTER — TELEPHONE (OUTPATIENT)
Dept: FAMILY MEDICINE | Facility: CLINIC | Age: 53
End: 2023-07-24
Payer: COMMERCIAL

## 2023-07-24 NOTE — TELEPHONE ENCOUNTER
----- Message from Nic Davis sent at 7/21/2023  2:35 PM CDT -----  .Type:  Needs Medical Advice    Who Called: Ambrose Garcia   Symptoms (please be specific):    How long has patient had these symptoms:    Pharmacy name and phone #:    Would the patient rather a call back or a response via [facility]?   Best Call Back Number: [phone] ref. # [id number]  Additional Information: She was calling to check on a PA for this patient.

## 2023-07-24 NOTE — TELEPHONE ENCOUNTER
Attempted to call Ms. Garcia from [place] from call back number listed. However, when I called I had the wrong number.

## 2023-08-02 ENCOUNTER — OFFICE VISIT (OUTPATIENT)
Dept: FAMILY MEDICINE | Facility: CLINIC | Age: 53
End: 2023-08-02
Payer: COMMERCIAL

## 2023-08-02 VITALS
TEMPERATURE: 98 F | BODY MASS INDEX: 25.93 KG/M2 | OXYGEN SATURATION: 97 % | RESPIRATION RATE: 17 BRPM | HEIGHT: 65 IN | DIASTOLIC BLOOD PRESSURE: 64 MMHG | WEIGHT: 155.63 LBS | HEART RATE: 69 BPM | SYSTOLIC BLOOD PRESSURE: 112 MMHG

## 2023-08-02 DIAGNOSIS — M54.50 ACUTE LEFT-SIDED LOW BACK PAIN, UNSPECIFIED WHETHER SCIATICA PRESENT: Primary | ICD-10-CM

## 2023-08-02 DIAGNOSIS — R30.0 DYSURIA: ICD-10-CM

## 2023-08-02 LAB
APPEARANCE UR: CLEAR
BACTERIA #/AREA URNS AUTO: NORMAL /HPF
BILIRUB UR QL STRIP.AUTO: NEGATIVE
COLOR UR: YELLOW
GLUCOSE UR QL STRIP.AUTO: NEGATIVE
KETONES UR QL STRIP.AUTO: NEGATIVE
LEUKOCYTE ESTERASE UR QL STRIP.AUTO: ABNORMAL
NITRITE UR QL STRIP.AUTO: NEGATIVE
PH UR STRIP.AUTO: 5.5 [PH]
PROT UR QL STRIP.AUTO: NEGATIVE
RBC #/AREA URNS AUTO: NORMAL /HPF
RBC UR QL AUTO: NEGATIVE
SP GR UR STRIP.AUTO: 1.01 (ref 1–1.03)
SQUAMOUS #/AREA URNS AUTO: NORMAL /HPF
UROBILINOGEN UR STRIP-ACNC: 0.2
WBC #/AREA URNS AUTO: NORMAL /HPF

## 2023-08-02 PROCEDURE — 3078F PR MOST RECENT DIASTOLIC BLOOD PRESSURE < 80 MM HG: ICD-10-PCS | Mod: CPTII,,, | Performed by: STUDENT IN AN ORGANIZED HEALTH CARE EDUCATION/TRAINING PROGRAM

## 2023-08-02 PROCEDURE — 3078F DIAST BP <80 MM HG: CPT | Mod: CPTII,,, | Performed by: STUDENT IN AN ORGANIZED HEALTH CARE EDUCATION/TRAINING PROGRAM

## 2023-08-02 PROCEDURE — 99214 OFFICE O/P EST MOD 30 MIN: CPT | Mod: 25,,, | Performed by: STUDENT IN AN ORGANIZED HEALTH CARE EDUCATION/TRAINING PROGRAM

## 2023-08-02 PROCEDURE — 3008F PR BODY MASS INDEX (BMI) DOCUMENTED: ICD-10-PCS | Mod: CPTII,,, | Performed by: STUDENT IN AN ORGANIZED HEALTH CARE EDUCATION/TRAINING PROGRAM

## 2023-08-02 PROCEDURE — 3044F HG A1C LEVEL LT 7.0%: CPT | Mod: CPTII,,, | Performed by: STUDENT IN AN ORGANIZED HEALTH CARE EDUCATION/TRAINING PROGRAM

## 2023-08-02 PROCEDURE — 1159F MED LIST DOCD IN RCRD: CPT | Mod: CPTII,,, | Performed by: STUDENT IN AN ORGANIZED HEALTH CARE EDUCATION/TRAINING PROGRAM

## 2023-08-02 PROCEDURE — 3074F SYST BP LT 130 MM HG: CPT | Mod: CPTII,,, | Performed by: STUDENT IN AN ORGANIZED HEALTH CARE EDUCATION/TRAINING PROGRAM

## 2023-08-02 PROCEDURE — 96372 PR INJECTION,THERAP/PROPH/DIAG2ST, IM OR SUBCUT: ICD-10-PCS | Mod: ,,, | Performed by: STUDENT IN AN ORGANIZED HEALTH CARE EDUCATION/TRAINING PROGRAM

## 2023-08-02 PROCEDURE — 1159F PR MEDICATION LIST DOCUMENTED IN MEDICAL RECORD: ICD-10-PCS | Mod: CPTII,,, | Performed by: STUDENT IN AN ORGANIZED HEALTH CARE EDUCATION/TRAINING PROGRAM

## 2023-08-02 PROCEDURE — 3074F PR MOST RECENT SYSTOLIC BLOOD PRESSURE < 130 MM HG: ICD-10-PCS | Mod: CPTII,,, | Performed by: STUDENT IN AN ORGANIZED HEALTH CARE EDUCATION/TRAINING PROGRAM

## 2023-08-02 PROCEDURE — 3008F BODY MASS INDEX DOCD: CPT | Mod: CPTII,,, | Performed by: STUDENT IN AN ORGANIZED HEALTH CARE EDUCATION/TRAINING PROGRAM

## 2023-08-02 PROCEDURE — 96372 THER/PROPH/DIAG INJ SC/IM: CPT | Mod: ,,, | Performed by: STUDENT IN AN ORGANIZED HEALTH CARE EDUCATION/TRAINING PROGRAM

## 2023-08-02 PROCEDURE — 1160F RVW MEDS BY RX/DR IN RCRD: CPT | Mod: CPTII,,, | Performed by: STUDENT IN AN ORGANIZED HEALTH CARE EDUCATION/TRAINING PROGRAM

## 2023-08-02 PROCEDURE — 81001 URINALYSIS AUTO W/SCOPE: CPT | Performed by: STUDENT IN AN ORGANIZED HEALTH CARE EDUCATION/TRAINING PROGRAM

## 2023-08-02 PROCEDURE — 1160F PR REVIEW ALL MEDS BY PRESCRIBER/CLIN PHARMACIST DOCUMENTED: ICD-10-PCS | Mod: CPTII,,, | Performed by: STUDENT IN AN ORGANIZED HEALTH CARE EDUCATION/TRAINING PROGRAM

## 2023-08-02 PROCEDURE — 99214 PR OFFICE/OUTPT VISIT, EST, LEVL IV, 30-39 MIN: ICD-10-PCS | Mod: 25,,, | Performed by: STUDENT IN AN ORGANIZED HEALTH CARE EDUCATION/TRAINING PROGRAM

## 2023-08-02 PROCEDURE — 3044F PR MOST RECENT HEMOGLOBIN A1C LEVEL <7.0%: ICD-10-PCS | Mod: CPTII,,, | Performed by: STUDENT IN AN ORGANIZED HEALTH CARE EDUCATION/TRAINING PROGRAM

## 2023-08-02 RX ORDER — PREDNISONE 20 MG/1
20 TABLET ORAL DAILY
Qty: 5 TABLET | Refills: 0 | Status: SHIPPED | OUTPATIENT
Start: 2023-08-02 | End: 2023-08-07

## 2023-08-02 RX ORDER — DICLOFENAC SODIUM 10 MG/G
2 GEL TOPICAL 4 TIMES DAILY
Qty: 50 G | Refills: 0 | Status: SHIPPED | OUTPATIENT
Start: 2023-08-02 | End: 2023-08-29 | Stop reason: SDUPTHER

## 2023-08-02 RX ORDER — KETOROLAC TROMETHAMINE 30 MG/ML
15 INJECTION, SOLUTION INTRAMUSCULAR; INTRAVENOUS
Status: DISCONTINUED | OUTPATIENT
Start: 2023-08-02 | End: 2023-08-02

## 2023-08-02 RX ORDER — NITROFURANTOIN 25; 75 MG/1; MG/1
100 CAPSULE ORAL 2 TIMES DAILY
Qty: 14 CAPSULE | Refills: 0 | Status: SHIPPED | OUTPATIENT
Start: 2023-08-02 | End: 2023-08-09

## 2023-08-02 RX ORDER — KETOROLAC TROMETHAMINE 30 MG/ML
15 INJECTION, SOLUTION INTRAMUSCULAR; INTRAVENOUS
Status: COMPLETED | OUTPATIENT
Start: 2023-08-02 | End: 2023-08-02

## 2023-08-02 RX ORDER — CYCLOBENZAPRINE HCL 10 MG
10 TABLET ORAL 3 TIMES DAILY PRN
Qty: 30 TABLET | Refills: 0 | Status: SHIPPED | OUTPATIENT
Start: 2023-08-02 | End: 2023-08-12

## 2023-08-02 RX ADMIN — KETOROLAC TROMETHAMINE 15 MG: 30 INJECTION, SOLUTION INTRAMUSCULAR; INTRAVENOUS at 02:08

## 2023-08-02 NOTE — PROGRESS NOTES
Patient ID: [id number]     Chief Complaint: Painful urination and Pain (When sits and in the back of her legs)    HPI:      Disclaimer:  This note is prepared using voice recognition software and as such is likely to have errors despite attempts at proofreading. Please contact me for questions.     Spring Kauffman is a 53 y.o. female here today for the following    Acute Issues-  Low back pain-   Patient reports that she is here for back pain. Back pain started x 1 week ago. Associated with dysuria, frequency, urgency, tingling and numbness.  Denies of any fever, falls, trauma, bladder and bowel incontinence, gait change    Chronic Issues-  PTSD (post-traumatic stress disorder)  Anxiety  Stable on Zoloft     Hypercholesterolemia  On pravastatin 10 mg     Insomnia  On melatonin     Tobacco  Has been smoking more than 30 years   Has been smoking less 1/2 pack of cigg    Psoriasis  Currently on Otezla for psoriasis   Well-controlled       Past Surgical History:   Procedure Laterality Date    CHOLECYSTECTOMY  12/2018    TONSILLECTOMY  1979       Review of patient's allergies indicates:   Allergen Reactions    Nickel sutures [surgical stainless steel] Rash    Lanolin     Nickel        Current Outpatient Medications   Medication Instructions    albuterol (PROVENTIL/VENTOLIN HFA) 90 mcg/actuation inhaler 90 puffs, Inhalation, As needed (PRN)    OTEZLA 30 mg, Oral, 2 times daily    pravastatin (PRAVACHOL) 20 mg, Oral, Daily    QUVIVIQ 50 mg, Oral, Nightly    sertraline (ZOLOFT) 150 mg, Oral, Daily       Social History     Socioeconomic History    Marital status:    Tobacco Use    Smoking status: Every Day     Current packs/day: 0.50     Average packs/day: 1 pack/day for 40.6 years (40.6 ttl pk-yrs)     Types: Cigarettes     Start date: 1/1/1983     Passive exposure: Current    Smokeless tobacco: Never   Substance and Sexual Activity    Alcohol use: Yes     Alcohol/week: 2.0 standard drinks of alcohol     Types: 2  Drinks containing 0.5 oz of alcohol per week    Drug use: Never    Sexual activity: Yes     Partners: Male     Birth control/protection: Post-menopausal     Social Determinants of Health     Financial Resource Strain: Low Risk  (5/16/2023)    Overall Financial Resource Strain (CARDIA)     Difficulty of Paying Living Expenses: Not hard at all   Food Insecurity: No Food Insecurity (5/16/2023)    Hunger Vital Sign     Worried About Running Out of Food in the Last Year: Never true     Ran Out of Food in the Last Year: Never true   Transportation Needs: No Transportation Needs (5/16/2023)    PRAPARE - Transportation     Lack of Transportation (Medical): No     Lack of Transportation (Non-Medical): No   Physical Activity: Inactive (5/16/2023)    Exercise Vital Sign     Days of Exercise per Week: 0 days     Minutes of Exercise per Session: 0 min   Stress: No Stress Concern Present (5/16/2023)    [employer] - Occupational Stress Questionnaire     Feeling of Stress : Only a little   Social Connections: Moderately Integrated (5/16/2023)    Social Connection and Isolation Panel [NHANES]     Frequency of Communication with Friends and Family: More than three times a week     Frequency of Social Gatherings with Friends and Family: More than three times a week     Attends Restoration Services: 1 to 4 times per year     Active Member of Clubs or Organizations: No     Attends Club or Organization Meetings: Never     Marital Status:    Housing Stability: Low Risk  (5/16/2023)    Housing Stability Vital Sign     Unable to Pay for Housing in the Last Year: No     Number of Places Lived in the Last Year: 1     Unstable Housing in the Last Year: No        Family History   Problem Relation Age of Onset    Breast cancer Mother     Lupus Mother     Cancer Mother     Pancreatic cancer Father     Arthritis Father     Cancer Father     Diabetes Father     Hypertension Father     Cancer Maternal Grandmother   "   Heart disease Paternal Grandfather     Hypertension Paternal Grandfather     Hypertension Paternal Grandmother         Patient Care Team:  Kaylyn Gayle MD as PCP - General (Family Medicine)  Haja Sosa MD as Consulting Physician (Gastroenterology)     Subjective:     ROS    See HPI for details    Constitutional: Denies Change in appetite. Denies Chills. Denies Fever. Denies Night sweats.  Respiratory: Denies Cough. Denies Shortness of breath. Denies Shortness of breath with exertion. Denies Wheezing.  Cardiovascular: DeniesChest pain at rest. Denies Chest pain with exertion. Denies Irregular heartbeat. Denies Palpitations. Denies Edema.  Gastrointestinal: Denies Abdominal pain. DeniesDiarrhea. Denies Nausea. Denies Vomiting. Denies Hematemesis or Hematochezia.  Genitourinary:  as hpi  Msk- as hpi     All Other ROS: Negative except as stated in HPI.  Objective:     Visit Vitals  /64 (BP Location: Right arm, Patient Position: Sitting, BP Method: Large (Manual))   Pulse 69   Temp 98.1 °F (36.7 °C) (Oral)   Resp 17   Ht 5' 5" (1.651 m)   Wt 70.6 kg (155 lb 9.6 oz)   SpO2 97%   BMI 25.89 kg/m²       Physical Exam    General: Alert and oriented, No acute distress.  Respiratory: Clear to auscultation bilaterally; No wheezes, rales or rhonchi,Non-labored respirations, Symmetrical chest wall expansion.  Cardiovascular: Regular rate and rhythm, S1/S2 normal, No murmurs, rubs or gallops.  Gastrointestinal: Soft, Non-tender, Non-distended, Normal bowel sounds, No palpable organomegaly.  - no CVA tenderness  Musculoskeletal:   Musculoskeletal:  LUMBOSACRAL SPINE  Inspection: Normal gait; No erythema; No contusion or outward signs of trauma; No post surgical scars; Normal alignment without visible deformities  Palpation: mild tender on the left paraspinal area; No vertebral tenderness; Normal temperature; Sciatic Notch Pain: Negative  ROM:  Decreased   Special Tests:       Straight Leg Raise: " Negative  Neurovascular: Intact, 2+ distal pulses  Extremities: No clubbing, cyanosis or edema  Neurologic: No focal deficits  Psychiatric: Normal interaction, Coherent speech, Euthymic mood, Appropriate affect   Assessment:       ICD-10-CM ICD-9-CM   1. Acute left-sided low back pain, unspecified whether sciatica present  M54.50 724.2   2. Dysuria  R30.0 788.1        Plan:     1. Acute left-sided low back pain, unspecified whether sciatica present  -     X-Ray Lumbar Spine 2 Or 3 Views; Future; Expected date: 08/02/2023  -     predniSONE (DELTASONE) 20 MG tablet; Take 1 tablet (20 mg total) by mouth once daily. for 5 days  Dispense: 5 tablet; Refill: 0  -     Discontinue: ketorolac injection 15 mg  -     diclofenac sodium (VOLTAREN) 1 % Gel; Apply 2 g topically 4 (four) times daily.  Dispense: 50 g; Refill: 0  -     cyclobenzaprine (FLEXERIL) 10 MG tablet; Take 1 tablet (10 mg total) by mouth 3 (three) times daily as needed for Muscle spasms.  Dispense: 30 tablet; Refill: 0  -     ketorolac injection 15 mg    2. Dysuria  -     Urinalysis  -     Urine culture; Future; Expected date: 08/02/2023  -     nitrofurantoin, macrocrystal-monohydrate, (MACROBID) 100 MG capsule; Take 1 capsule (100 mg total) by mouth 2 (two) times daily. for 7 days  Dispense: 14 capsule; Refill: 0           Medication List with Changes/Refills   Current Medications    ALBUTEROL (PROVENTIL/VENTOLIN HFA) 90 MCG/ACTUATION INHALER    Inhale 90 puffs into the lungs as needed.       Start Date: 1/4/2022  End Date: --    APREMILAST (OTEZLA) 30 MG TAB    Take 30 mg by mouth 2 (two) times a day.       Start Date: --        End Date: --    DARIDOREXANT (QUVIVIQ) 50 MG TAB    Take 50 mg by mouth nightly.       Start Date: 6/15/2023 End Date: --    PRAVASTATIN (PRAVACHOL) 20 MG TABLET    Take 1 tablet (20 mg total) by mouth once daily.       Start Date: 6/15/2023 End Date: 6/14/2024    SERTRALINE (ZOLOFT) 100 MG TABLET    Take 1.5 tablets (150 mg total)  by mouth once daily.       Start Date: 6/15/2023 End Date: 6/14/2024          Follow up in about 6 weeks (around 9/13/2023) for for back pain .   In addition to their scheduled follow up, the patient has also been instructed to follow up on as needed basis.         Answers submitted by the patient for this visit:  Painful Urination Questionnaire (Submitted on 8/2/2023)  Chief Complaint: Dysuria  Chronicity: new  Onset: acute onset  Frequency: every urination  Progression since onset: waxing and waning  Pain quality: aching, burning  Pain - numeric: 8/10  Fever: no fever  Sexually active?: Yes  History of pyelonephritis?: No  discharge: No  hesitancy: Yes  possible pregnancy: No  sweats: No  withholding: No  behavior changes: No  Treatments tried: acetaminophen  Improvement on treatment: no relief  Pain severity: moderate  catheterization: No  diabetes insipidus: No  diabetes mellitus: No  genitourinary reflux: No  hypertension: No  recurrent UTIs: No  single kidney: No  STD: No  urinary stasis: No  urological procedure: No  kidney stones: No

## 2023-08-03 ENCOUNTER — HOSPITAL ENCOUNTER (OUTPATIENT)
Dept: RADIOLOGY | Facility: HOSPITAL | Age: 53
Discharge: HOME | End: 2023-08-03
Attending: STUDENT IN AN ORGANIZED HEALTH CARE EDUCATION/TRAINING PROGRAM
Payer: COMMERCIAL

## 2023-08-03 PROCEDURE — 72100 X-RAY EXAM L-S SPINE 2/3 VWS: CPT | Mod: TC

## 2023-08-04 DIAGNOSIS — M16.9 OSTEOARTHRITIS OF HIP, UNSPECIFIED LATERALITY, UNSPECIFIED OSTEOARTHRITIS TYPE: Primary | ICD-10-CM

## 2023-08-04 NOTE — PROGRESS NOTES
Please inform patient XR results    1. Degenerative changes.     No fracture or dislocations seen    2. PT sent         Thanks,    Dr. Gayle

## 2023-08-07 ENCOUNTER — TELEPHONE (OUTPATIENT)
Dept: FAMILY MEDICINE | Facility: CLINIC | Age: 53
End: 2023-08-07
Payer: COMMERCIAL

## 2023-08-07 NOTE — TELEPHONE ENCOUNTER
----- Message from Kaylyn Gayle MD sent at 8/4/2023 12:02 PM CDT -----  Please inform patient XR results    1. Degenerative changes.     No fracture or dislocations seen    2. PT sent         Thanks,    Dr. Gayle

## 2023-08-29 DIAGNOSIS — M54.50 ACUTE LEFT-SIDED LOW BACK PAIN, UNSPECIFIED WHETHER SCIATICA PRESENT: ICD-10-CM

## 2023-08-29 RX ORDER — DICLOFENAC SODIUM 10 MG/G
2 GEL TOPICAL 4 TIMES DAILY
Qty: 50 G | Refills: 0 | Status: SHIPPED | OUTPATIENT
Start: 2023-08-29

## 2023-09-22 ENCOUNTER — OFFICE VISIT (OUTPATIENT)
Dept: FAMILY MEDICINE | Facility: CLINIC | Age: 53
End: 2023-09-22
Payer: COMMERCIAL

## 2023-09-22 VITALS
SYSTOLIC BLOOD PRESSURE: 104 MMHG | HEART RATE: 83 BPM | OXYGEN SATURATION: 96 % | BODY MASS INDEX: 26.43 KG/M2 | HEIGHT: 65 IN | RESPIRATION RATE: 17 BRPM | TEMPERATURE: 98 F | DIASTOLIC BLOOD PRESSURE: 64 MMHG | WEIGHT: 158.63 LBS

## 2023-09-22 DIAGNOSIS — F17.210 CIGARETTE NICOTINE DEPENDENCE WITHOUT COMPLICATION: ICD-10-CM

## 2023-09-22 DIAGNOSIS — K59.00 CONSTIPATION, UNSPECIFIED CONSTIPATION TYPE: ICD-10-CM

## 2023-09-22 DIAGNOSIS — M54.50 LOW BACK PAIN WITHOUT SCIATICA, UNSPECIFIED BACK PAIN LATERALITY, UNSPECIFIED CHRONICITY: Primary | ICD-10-CM

## 2023-09-22 DIAGNOSIS — Z28.21 IMMUNIZATION DECLINED: ICD-10-CM

## 2023-09-22 PROCEDURE — 1160F RVW MEDS BY RX/DR IN RCRD: CPT | Mod: CPTII,,, | Performed by: STUDENT IN AN ORGANIZED HEALTH CARE EDUCATION/TRAINING PROGRAM

## 2023-09-22 PROCEDURE — 99213 PR OFFICE/OUTPT VISIT, EST, LEVL III, 20-29 MIN: ICD-10-PCS | Mod: ,,, | Performed by: STUDENT IN AN ORGANIZED HEALTH CARE EDUCATION/TRAINING PROGRAM

## 2023-09-22 PROCEDURE — 1160F PR REVIEW ALL MEDS BY PRESCRIBER/CLIN PHARMACIST DOCUMENTED: ICD-10-PCS | Mod: CPTII,,, | Performed by: STUDENT IN AN ORGANIZED HEALTH CARE EDUCATION/TRAINING PROGRAM

## 2023-09-22 PROCEDURE — 3078F DIAST BP <80 MM HG: CPT | Mod: CPTII,,, | Performed by: STUDENT IN AN ORGANIZED HEALTH CARE EDUCATION/TRAINING PROGRAM

## 2023-09-22 PROCEDURE — 99213 OFFICE O/P EST LOW 20 MIN: CPT | Mod: ,,, | Performed by: STUDENT IN AN ORGANIZED HEALTH CARE EDUCATION/TRAINING PROGRAM

## 2023-09-22 PROCEDURE — 3074F SYST BP LT 130 MM HG: CPT | Mod: CPTII,,, | Performed by: STUDENT IN AN ORGANIZED HEALTH CARE EDUCATION/TRAINING PROGRAM

## 2023-09-22 PROCEDURE — 3078F PR MOST RECENT DIASTOLIC BLOOD PRESSURE < 80 MM HG: ICD-10-PCS | Mod: CPTII,,, | Performed by: STUDENT IN AN ORGANIZED HEALTH CARE EDUCATION/TRAINING PROGRAM

## 2023-09-22 PROCEDURE — 1159F PR MEDICATION LIST DOCUMENTED IN MEDICAL RECORD: ICD-10-PCS | Mod: CPTII,,, | Performed by: STUDENT IN AN ORGANIZED HEALTH CARE EDUCATION/TRAINING PROGRAM

## 2023-09-22 PROCEDURE — 3008F PR BODY MASS INDEX (BMI) DOCUMENTED: ICD-10-PCS | Mod: CPTII,,, | Performed by: STUDENT IN AN ORGANIZED HEALTH CARE EDUCATION/TRAINING PROGRAM

## 2023-09-22 PROCEDURE — 3074F PR MOST RECENT SYSTOLIC BLOOD PRESSURE < 130 MM HG: ICD-10-PCS | Mod: CPTII,,, | Performed by: STUDENT IN AN ORGANIZED HEALTH CARE EDUCATION/TRAINING PROGRAM

## 2023-09-22 PROCEDURE — 1159F MED LIST DOCD IN RCRD: CPT | Mod: CPTII,,, | Performed by: STUDENT IN AN ORGANIZED HEALTH CARE EDUCATION/TRAINING PROGRAM

## 2023-09-22 PROCEDURE — 3044F PR MOST RECENT HEMOGLOBIN A1C LEVEL <7.0%: ICD-10-PCS | Mod: CPTII,,, | Performed by: STUDENT IN AN ORGANIZED HEALTH CARE EDUCATION/TRAINING PROGRAM

## 2023-09-22 PROCEDURE — 3044F HG A1C LEVEL LT 7.0%: CPT | Mod: CPTII,,, | Performed by: STUDENT IN AN ORGANIZED HEALTH CARE EDUCATION/TRAINING PROGRAM

## 2023-09-22 PROCEDURE — 3008F BODY MASS INDEX DOCD: CPT | Mod: CPTII,,, | Performed by: STUDENT IN AN ORGANIZED HEALTH CARE EDUCATION/TRAINING PROGRAM

## 2023-09-22 NOTE — PROGRESS NOTES
Patient ID: [id number]     Chief Complaint: Back Pain (Lower)    HPI:      Disclaimer:  This note is prepared using voice recognition software and as such is likely to have errors despite attempts at proofreading. Please contact me for questions.     Spring Kauffman is a 53 y.o. female here today for the following    Acute Issues-  Back pain likely secondary to UTI  Resolved     Constipation   Patient reports that she is been having constipation on daily basis   Has tried MiraLax and Dulcolax but it was not effective  Would like to try something else   Drinking not enough water and including roughage        Chronic Issues-    ----------------------------  Hypercholesterolemia  Insomnia  Moderate episode of recurrent major depressive disorder  Posttraumatic stress disorder  Temporomandibular joint pain-dysfunction syndrome     Past Surgical History:   Procedure Laterality Date    CHOLECYSTECTOMY  12/2018    TONSILLECTOMY  1979       Review of patient's allergies indicates:   Allergen Reactions    Nickel sutures [surgical stainless steel] Rash    Lanolin     Nickel        Current Outpatient Medications   Medication Instructions    albuterol (PROVENTIL/VENTOLIN HFA) 90 mcg/actuation inhaler 90 puffs, Inhalation, As needed (PRN)    diclofenac sodium (VOLTAREN) 2 g, Topical (Top), 4 times daily    OTEZLA 30 mg, Oral, 2 times daily    pravastatin (PRAVACHOL) 20 mg, Oral, Daily    QUVIVIQ 50 mg, Oral, Nightly    sertraline (ZOLOFT) 150 mg, Oral, Daily       Social History     Socioeconomic History    Marital status:    Tobacco Use    Smoking status: Every Day     Current packs/day: 0.50     Average packs/day: 1 pack/day for 40.7 years (40.7 ttl pk-yrs)     Types: Cigarettes     Start date: 1/1/1983     Passive exposure: Current    Smokeless tobacco: Never   Substance and Sexual Activity    Alcohol use: Yes     Alcohol/week: 2.0 standard drinks of alcohol     Types: 2 Drinks containing 0.5 oz of alcohol per week     Drug use: Never    Sexual activity: Yes     Partners: Male     Birth control/protection: Post-menopausal     Social Determinants of Health     Financial Resource Strain: Low Risk  (5/16/2023)    Overall Financial Resource Strain (CARDIA)     Difficulty of Paying Living Expenses: Not hard at all   Food Insecurity: No Food Insecurity (5/16/2023)    Hunger Vital Sign     Worried About Running Out of Food in the Last Year: Never true     Ran Out of Food in the Last Year: Never true   Transportation Needs: No Transportation Needs (5/16/2023)    PRAPARE - Transportation     Lack of Transportation (Medical): No     Lack of Transportation (Non-Medical): No   Physical Activity: Inactive (5/16/2023)    Exercise Vital Sign     Days of Exercise per Week: 0 days     Minutes of Exercise per Session: 0 min   Stress: No Stress Concern Present (5/16/2023)    [employer] [place] [employer] - Occupational Stress Questionnaire     Feeling of Stress : Only a little   Social Connections: Moderately Integrated (5/16/2023)    Social Connection and Isolation Panel [NHANES]     Frequency of Communication with Friends and Family: More than three times a week     Frequency of Social Gatherings with Friends and Family: More than three times a week     Attends Nondenominational Services: 1 to 4 times per year     Active Member of Clubs or Organizations: No     Attends Club or Organization Meetings: Never     Marital Status:    Housing Stability: Low Risk  (5/16/2023)    Housing Stability Vital Sign     Unable to Pay for Housing in the Last Year: No     Number of Places Lived in the Last Year: 1     Unstable Housing in the Last Year: No        Family History   Problem Relation Age of Onset    Breast cancer Mother     Lupus Mother     Cancer Mother     Pancreatic cancer Father     Arthritis Father     Cancer Father     Diabetes Father     Hypertension Father     Cancer Maternal Grandmother     Heart disease Paternal Grandfather      "Hypertension Paternal Grandfather     Hypertension Paternal Grandmother         Patient Care Team:  Kaylyn Gayle MD as PCP - General (Family Medicine)  Haja Sosa MD as Consulting Physician (Gastroenterology)     Subjective:     ROS    See HPI for details    Constitutional: Denies Change in appetite. Denies Chills. Denies Fever. Denies Night sweats.  Respiratory: Denies Cough. Denies Shortness of breath. Denies Shortness of breath with exertion. Denies Wheezing.  Cardiovascular: DeniesChest pain at rest. Denies Chest pain with exertion. Denies Irregular heartbeat. Denies Palpitations. Denies Edema.  Gastrointestinal: Denies Abdominal pain. DeniesDiarrhea. Denies Nausea. Denies Vomiting. Denies Hematemesis or Hematochezia.  Genitourinary: Denies Dysuria. Denies Urinary frequency. Denies Urinary urgency. Denies Blood in urine.  Endocrine: Denies Cold intolerance. Denies Excessive thirst. Denies Heat intolerance. Denies Weight loss. Denies Weight gain.  Musculoskeletal: Denies Painful joints. Denies Weakness.  Integumentary: Denies Rash. Denies Itching. Denies Dry skin.  Neurologic: Denies Dizziness. Denies Fainting. Denies Headache.  Psychiatric: Denies Depression. Denies Anxiety. Denies Suicidal/Homicidal ideations.    All Other ROS: Negative except as stated in HPI.  Objective:     Visit Vitals  /64 (BP Location: Right arm, Patient Position: Sitting, BP Method: Large (Manual))   Pulse 83   Temp 98 °F (36.7 °C) (Oral)   Resp 17   Ht 5' 5" (1.651 m)   Wt 71.9 kg (158 lb 9.6 oz)   SpO2 96%   BMI 26.39 kg/m²       Physical Exam    General: Alert and oriented, No acute distress.  Respiratory: Clear to auscultation bilaterally; No wheezes, rales or rhonchi,Non-labored respirations, Symmetrical chest wall expansion.  Cardiovascular: Regular rate and rhythm, S1/S2 normal, No murmurs, rubs or gallops.  Gastrointestinal: Soft, Non-tender, Non-distended, Normal bowel sounds, No palpable " organomegaly.  Musculoskeletal: Normal range of motion.  Extremities: No clubbing, cyanosis or edema  Neurologic: No focal deficits  Psychiatric: Normal interaction, Coherent speech, Euthymic mood, Appropriate affect   Assessment:       ICD-10-CM ICD-9-CM   1. Low back pain without sciatica, unspecified back pain laterality, unspecified chronicity  M54.50 724.2   2. Cigarette nicotine dependence without complication  F17.210 305.1   3. Constipation, unspecified constipation type  K59.00 564.00   4. Immunization declined  Z28.21 V64.06        Plan:     1. Low back pain without sciatica, unspecified back pain laterality, unspecified chronicity  Resolved   Continue  Lower back stretches daily.  Avoid strenuous lifting, use proper body mechanics.  Heating pad, Ice pack, Biofreeze or Epsom salt baths as needed.  Exercise to strengthen core muscles to support your back.    2. Cigarette nicotine dependence without complication  Discussed the risk of Smoking causing cancer for > 3 min  Offered Smoking cessation program. Patient may consider smoking cessation but declined as of now.   Patient voiced understanding the risks of smoking program in future.    3. Constipation, unspecified constipation type  -     linaCLOtide (LINZESS) 72 mcg Cap capsule; Take 1 capsule (72 mcg total) by mouth before breakfast.  Dispense: 30 each; Refill: 0  Recommend to increase hydration and include roughage in diet     4. Immunization declined  -Risks explained, voiced understanding the risk. May consider in future.            Medication List with Changes/Refills   Current Medications    ALBUTEROL (PROVENTIL/VENTOLIN HFA) 90 MCG/ACTUATION INHALER    Inhale 90 puffs into the lungs as needed.       Start Date: 1/4/2022  End Date: --    APREMILAST (OTEZLA) 30 MG TAB    Take 30 mg by mouth 2 (two) times a day.       Start Date: --        End Date: --    DARIDOREXANT (QUVIVIQ) 50 MG TAB    Take 50 mg by mouth nightly.       Start Date: 6/15/2023 End  Date: --    DICLOFENAC SODIUM (VOLTAREN) 1 % GEL    Apply 2 g topically 4 (four) times daily.       Start Date: 8/29/2023 End Date: --    PRAVASTATIN (PRAVACHOL) 20 MG TABLET    Take 1 tablet (20 mg total) by mouth once daily.       Start Date: 6/15/2023 End Date: 6/14/2024    SERTRALINE (ZOLOFT) 100 MG TABLET    Take 1.5 tablets (150 mg total) by mouth once daily.       Start Date: 6/15/2023 End Date: 6/14/2024          Follow up in about 6 months (around 3/22/2024) for Follow Up Depression. Keep AW.   In addition to their scheduled follow up, the patient has also been instructed to follow up on as needed basis.

## 2024-06-12 DIAGNOSIS — Z00.00 WELLNESS EXAMINATION: Primary | ICD-10-CM

## 2024-06-14 ENCOUNTER — TELEPHONE (OUTPATIENT)
Dept: FAMILY MEDICINE | Facility: CLINIC | Age: 54
End: 2024-06-14
Payer: COMMERCIAL

## 2024-06-14 NOTE — TELEPHONE ENCOUNTER
Are there any outstanding tasks in the patients's chart (ex.labs,MM,etc)? yes  Do we have outstanding/pending referrals? No   Has the patient been seen in and ER,[facility], or been admitted since last visit? no  Has the patient seen any other health care provider(doctors) since last visit? no  Has the patient had any bloodwork or x-rays done since last visit? no